# Patient Record
Sex: FEMALE | Race: WHITE | Employment: OTHER | URBAN - METROPOLITAN AREA
[De-identification: names, ages, dates, MRNs, and addresses within clinical notes are randomized per-mention and may not be internally consistent; named-entity substitution may affect disease eponyms.]

---

## 2019-11-21 ENCOUNTER — HOSPITAL ENCOUNTER (EMERGENCY)
Age: 84
Discharge: HOME | End: 2019-11-21
Attending: FAMILY MEDICINE
Payer: MEDICARE

## 2019-11-21 VITALS
TEMPERATURE: 97.9 F | BODY MASS INDEX: 25.49 KG/M2 | OXYGEN SATURATION: 95 % | WEIGHT: 135 LBS | RESPIRATION RATE: 18 BRPM | HEART RATE: 79 BPM | HEIGHT: 61 IN | DIASTOLIC BLOOD PRESSURE: 86 MMHG | SYSTOLIC BLOOD PRESSURE: 142 MMHG

## 2019-11-21 DIAGNOSIS — R04.0 EPISTAXIS: Primary | ICD-10-CM

## 2019-11-21 PROCEDURE — 30901 CONTROL OF NOSEBLEED: CPT

## 2019-11-21 PROCEDURE — 6370000000 HC RX 637 (ALT 250 FOR IP): Performed by: FAMILY MEDICINE

## 2019-11-21 PROCEDURE — 99282 EMERGENCY DEPT VISIT SF MDM: CPT

## 2019-11-21 RX ORDER — CEPHALEXIN 500 MG/1
500 CAPSULE ORAL ONCE
Status: COMPLETED | OUTPATIENT
Start: 2019-11-21 | End: 2019-11-21

## 2019-11-21 RX ORDER — CEPHALEXIN 500 MG/1
500 CAPSULE ORAL 4 TIMES DAILY
Qty: 40 CAPSULE | Refills: 0 | Status: SHIPPED | OUTPATIENT
Start: 2019-11-21 | End: 2019-12-01

## 2019-11-21 RX ADMIN — CEPHALEXIN 500 MG: 500 CAPSULE ORAL at 18:07

## 2021-04-11 ENCOUNTER — HOSPITAL ENCOUNTER (EMERGENCY)
Age: 86
Discharge: HOME | End: 2021-04-11
Payer: MEDICARE

## 2021-04-11 VITALS
DIASTOLIC BLOOD PRESSURE: 88 MMHG | TEMPERATURE: 100.7 F | SYSTOLIC BLOOD PRESSURE: 156 MMHG | BODY MASS INDEX: 25.51 KG/M2 | HEART RATE: 73 BPM | WEIGHT: 135 LBS | OXYGEN SATURATION: 94 % | RESPIRATION RATE: 20 BRPM

## 2021-04-11 DIAGNOSIS — N30.01 ACUTE CYSTITIS WITH HEMATURIA: Primary | ICD-10-CM

## 2021-04-11 LAB
BACTERIA: ABNORMAL /HPF
BILIRUBIN URINE: NEGATIVE
BLOOD, URINE: ABNORMAL
CLARITY: ABNORMAL
COLOR: YELLOW
EPITHELIAL CELLS, UA: ABNORMAL /HPF
GLUCOSE URINE: NEGATIVE MG/DL
HYALINE CASTS: ABNORMAL /LPF (ref 0–2)
KETONES, URINE: NEGATIVE MG/DL
LEUKOCYTE ESTERASE, URINE: ABNORMAL
MUCUS: PRESENT /LPF
NITRITE, URINE: NEGATIVE
PH UA: 5.5 (ref 5–9)
PROTEIN UA: NEGATIVE MG/DL
RBC UA: ABNORMAL /HPF (ref 0–2)
SPECIFIC GRAVITY UA: 1.02 (ref 1–1.03)
UROBILINOGEN, URINE: 0.2 E.U./DL
WBC UA: ABNORMAL /HPF (ref 0–5)

## 2021-04-11 PROCEDURE — 99211 OFF/OP EST MAY X REQ PHY/QHP: CPT

## 2021-04-11 PROCEDURE — 81001 URINALYSIS AUTO W/SCOPE: CPT

## 2021-04-11 PROCEDURE — 87088 URINE BACTERIA CULTURE: CPT

## 2021-04-11 RX ORDER — CEFDINIR 300 MG/1
300 CAPSULE ORAL 2 TIMES DAILY
Qty: 20 CAPSULE | Refills: 0 | Status: SHIPPED | OUTPATIENT
Start: 2021-04-11 | End: 2021-04-21

## 2021-04-11 RX ORDER — IBUPROFEN 200 MG
400 TABLET ORAL EVERY 6 HOURS PRN
COMMUNITY

## 2021-04-11 NOTE — ED PROVIDER NOTES
[facility]  Department of Emergency Medicine   ED  Encounter Note  Admit Date/RoomTime: 2021  6:13 PM  ED Room:     NAME: Akilah Cartwright  : 1933  MRN: [id number]     Chief Complaint:  Dysuria (Was treated for UTI  2 weeks ago. Was feeling better. Then last night started not feeling well and also having pain after urination.) and Chills (Started having chills today.)    History of Present Illness       Akilah Cartwright is a 80 y.o. old female who presents to the emergency department with a complaint of urinary tract infection. Patient states she gets recurrent urinary tract infections. She did go to [place] urgent care a few weeks ago and was diagnosed with a UTI. Put on Macrobid for 7 days. She states she did feel fine for about a week. Then last night she started with the issue again. It burns when she urinates. She says at the end of urination she gets a pain in her bladder and actually a weird tingling sensation that goes down both her arms. She states this is a normal presentation for her, even though it is odd yesterday she was also achy. Chills. Tired. Today the dysuria continued so family brought her here to urgent care. Family states in the past she has never been on Macrobid for her UTIs. Normally she is on Cipro. Regarding the weird tingling sensation down her arms. She does not get any pain in the chest.  No palpitations. No shortness of breath. She does not feel like she is going to pass out. ROS   Pertinent positives and negatives are stated within HPI, all other systems reviewed and are negative. Past Medical History:  has a past medical history of Arthritis, Atrial fibrillation ([address] Cancer ([address] Hx of blood clots, Hypertension, and Primary biliary cirrhosis ([address] Surgical History:  has a past surgical history that includes Hysterectomy; Breast surgery; Varicose vein surgery; skin biopsy; Colonoscopy;  Cataract removal with implant (10 08 2012); Cataract removal with implant (01-07-13); back surgery; eye surgery; Cardiac surgery; other surgical history (3/19/15); and other surgical history (3/19/15 ). Social History:  reports that she has never smoked. She has never used smokeless tobacco. She reports that she does not drink alcohol or use drugs. Family History: family history is not on file. Allergies: Quinine derivatives, Hydrochlorothiazide, and Penicillins    Physical Exam   Oxygen Saturation Interpretation: Normal.        ED Triage Vitals [04/11/21 1820]   BP Temp Temp Source Pulse Resp SpO2 Height Weight   (!) 156/88 100.7 °F (38.2 °C) Infrared 73 20 94 % -- 135 lb (61.2 kg)         General:  NAD. Alert and Oriented. Well-appearing. Low-grade fever of 100.7. Skin:  Warm, dry. No rashes. Head:  Normocephalic. Atraumatic. Eyes:  EOMI. Conjunctiva normal.  ENT:  Oral mucosa moist.  Airway patent. Neck:  Supple. Normal ROM. Respiratory:  No respiratory distress. No labored breathing. Lungs clear without rales, rhonchi or wheezing. Cardiovascular:  Regular rate. No Murmur. No peripheral edema. Extremities warm and good color. Chest:  Abdomen:  Soft, nondistended. Normal bowel sounds. Nontender to palpation all 4 quadrants. Negative rebound, negative guarding. Rectal:  Gu: Bladder nontender and non distended. No CVA tenderness. Pelvic:  Extremities:  Normal ROM. Nontender to palpation. Atraumatic. Arthritic changes to both hands with digit deformity. Back:  Normal ROM. Nontender to palpation. Neuro:  Alert and Oriented to person, place, time and situation. Normal LOC. Moves all extremities. Speech fluent. Psych:  Calm and Cooperative. Normal thought process. Normal judgement.         Lab / Imaging Results   (All laboratory and radiology results have been personally reviewed by myself)  Labs:  Results for orders placed or performed during the hospital encounter of 04/11/21   Urinalysis, reflex to microscopic   Result Value Ref Range    Color, UA Yellow Straw/Yellow    Clarity, UA SL CLOUDY Clear    Glucose, Ur Negative Negative mg/dL    Bilirubin Urine Negative Negative    Ketones, Urine Negative Negative mg/dL    Specific Gravity, UA 1.025 1.005 - 1.030    Blood, Urine TRACE (A) Negative    pH, UA 5.5 5.0 - 9.0    Protein, UA Negative Negative mg/dL    Urobilinogen, Urine 0.2 <2.0 E.U./dL    Nitrite, Urine Negative Negative    Leukocyte Esterase, Urine TRACE (A) Negative   Microscopic Urinalysis   Result Value Ref Range    Hyaline Casts, UA 0-2 0 - 2 /LPF    Mucus, UA Present (A) None Seen /LPF    WBC, UA 10-20 (A) 0 - 5 /HPF    RBC, UA 0-1 0 - 2 /HPF    Epithelial Cells, UA RARE /HPF    Bacteria, UA NONE SEEN None Seen /HPF       Imaging: All Radiology results interpreted by Radiologist unless otherwise noted. No orders to display     ED Course / Medical Decision Making   Medications - No data to display     Re-examination:  4/11/21       Time:     Patients symptoms . Consults:   None    Procedures:   none    Medical Decision Making:    Patient is well appearing, non toxic and appropriate for outpatient management. Plan is for symptom management and PCP follow up. As discussed with patient and daughter at bedside. I will do a urine culture. At this time there is only a trace amount of infection. Patient however is very symptomatic with burning, bladder pressure, fevers and chills. They will follow up in 3 days for urine culture results and to make sure she is on the best antibiotic. Counseling:  I reviewed today's visit with the patient in addition to providing specific details for the plan of care and counseling regarding the diagnosis and prognosis. Questions are answered at this time and are agreeable with the plan. Assessment      1. Acute cystitis with hematuria      Plan   Disposition:   Discharge home.   Patient condition is good    New Medications     New Prescriptions    CEFDINIR (OMNICEF) 300 MG CAPSULE    Take 1 capsule by mouth 2 times daily for 10 days     Electronically signed by MARIA INES Noel   DD: 4/11/21  **This report was transcribed using voice recognition software. Every effort was made to ensure accuracy; however, inadvertent computerized transcription errors may be present.   END OF ED PROVIDER NOTE         Giacomo Lopez [place]  04/11/21 0843

## 2021-04-13 LAB — URINE CULTURE, ROUTINE: NORMAL

## 2021-08-23 ENCOUNTER — HOSPITAL ENCOUNTER (EMERGENCY)
Age: 86
Discharge: HOME | End: 2021-08-23
Payer: MEDICARE

## 2021-08-23 VITALS
BODY MASS INDEX: 24.94 KG/M2 | SYSTOLIC BLOOD PRESSURE: 160 MMHG | TEMPERATURE: 98 F | WEIGHT: 132 LBS | HEART RATE: 68 BPM | RESPIRATION RATE: 16 BRPM | DIASTOLIC BLOOD PRESSURE: 78 MMHG | OXYGEN SATURATION: 97 %

## 2021-08-23 DIAGNOSIS — Z51.89 VISIT FOR WOUND CHECK: ICD-10-CM

## 2021-08-23 DIAGNOSIS — R58 BLEEDING: Primary | ICD-10-CM

## 2021-08-23 PROCEDURE — 6370000000 HC RX 637 (ALT 250 FOR IP): Performed by: NURSE PRACTITIONER

## 2021-08-23 PROCEDURE — 99211 OFF/OP EST MAY X REQ PHY/QHP: CPT

## 2021-08-23 RX ADMIN — Medication 1 EACH: at 18:40

## 2021-08-23 ASSESSMENT — PAIN DESCRIPTION - ORIENTATION: ORIENTATION: LEFT

## 2021-08-23 ASSESSMENT — PAIN SCALES - GENERAL: PAINLEVEL_OUTOF10: 3

## 2021-08-23 ASSESSMENT — PAIN DESCRIPTION - LOCATION: LOCATION: ARM

## 2021-08-23 NOTE — ED PROVIDER NOTES
derivatives, Hydrochlorothiazide, and Penicillins    Physical Exam   Oxygen Saturation Interpretation: Normal.   ED Triage Vitals [08/23/21 1823]   BP Temp Temp src Pulse Resp SpO2 Height Weight   (!) 160/78 98 °F (36.7 °C) -- 68 16 97 % -- 132 lb (59.9 kg)       Physical Exam  · Constitutional/General: Alert and oriented x3, well appearing, non toxic in NAD  · HEENT:  NC/NT. clear  Airway patent. · Neck: Supple, full ROM  · Respiratory:  Not in respiratory distress  · CV:  Regular rate. · Musculoskeletal: Moves all extremities x 4.   · Integument: skin warm and dry. No rashes.,  On the left forearm she has a Coban wrap along with the some gauze and it is bleeding through the dressing that she has on it. · Lymphatic: no lymphadenopathy noted  · Neurologic: GCS 15, no focal deficits, symmetric strength 5/5 in the upper and lower extremities bilaterally  · Psychiatric: Normal Affect    Lab / Imaging Results   (All laboratory and radiology results have been personally reviewed by myself)  Labs:  No results found for this visit on 08/23/21. Imaging: All Radiology results interpreted by Radiologist unless otherwise noted. No orders to display       ED Course / Medical Decision Making     Medications   gelatin adsorbable (GELFOAM) sponge 1 each (has no administration in time range)          Consult(s):   None    MDM:   I did remove the dressing that was on there and she has sutures in place and she has a small stream of blood oozing through the wound. It stops with pressure but as soon as pressure is let up it starts to bleed again. Wound was redressed I did apply surgifoam,Telfa ABD pads and an Ace wrap over this wound. I told her to go to the emergency department for further evaluation if there is any further bleeding. The dressing is remaining dry at this point , it is not bleeding through. Assessment      1. Bleeding    2.  Visit for wound check      Plan     Go to the  emergency department if there is any further bleeding, call Dr. Carlota Sen for further instructions if the bleeding stops    Discharge to home and advised to contact Georgia Hidalgo MD  [address] [place] [id number] [phone]    Schedule an appointment as soon as possible for a visit      Patient condition is good    New Medications     New Prescriptions    No medications on file     Electronically signed by GILBERT Loo CNP   DD: 8/23/21  **This report was transcribed using voice recognition software. Every effort was made to ensure accuracy; however, inadvertent computerized transcription errors may be present.   END OF ED PROVIDER NOTE     GILBERT Loo CNP  08/23/21 [id number]

## 2021-08-23 NOTE — ED NOTES
Gel foam applied with telfa dressing, gauze and ace wrap per order. Pt daughter instructed to monitor pt bleeding and circulation and adjust ace wrap as needed. Take pt to the ED if bleeding continues. Verbalized understanding.      Radha Crawford LPN  82/60/51 [phone]

## 2021-11-24 ENCOUNTER — OFFICE VISIT (OUTPATIENT)
Dept: SURGERY | Age: 86
End: 2021-11-24
Payer: MEDICARE

## 2021-11-24 VITALS
DIASTOLIC BLOOD PRESSURE: 82 MMHG | SYSTOLIC BLOOD PRESSURE: 132 MMHG | OXYGEN SATURATION: 97 % | WEIGHT: 130 LBS | TEMPERATURE: 97.3 F | HEIGHT: 61 IN | RESPIRATION RATE: 12 BRPM | BODY MASS INDEX: 24.55 KG/M2 | HEART RATE: 73 BPM

## 2021-11-24 DIAGNOSIS — C44.92 SCC (SQUAMOUS CELL CARCINOMA): Primary | ICD-10-CM

## 2021-11-24 PROCEDURE — G8427 DOCREV CUR MEDS BY ELIG CLIN: HCPCS | Performed by: PLASTIC SURGERY

## 2021-11-24 PROCEDURE — 99203 OFFICE O/P NEW LOW 30 MIN: CPT | Performed by: PLASTIC SURGERY

## 2021-11-24 PROCEDURE — 1123F ACP DISCUSS/DSCN MKR DOCD: CPT | Performed by: PLASTIC SURGERY

## 2021-11-24 PROCEDURE — 4040F PNEUMOC VAC/ADMIN/RCVD: CPT | Performed by: PLASTIC SURGERY

## 2021-11-24 PROCEDURE — 1036F TOBACCO NON-USER: CPT | Performed by: PLASTIC SURGERY

## 2021-11-24 PROCEDURE — G8420 CALC BMI NORM PARAMETERS: HCPCS | Performed by: PLASTIC SURGERY

## 2021-11-24 PROCEDURE — G8484 FLU IMMUNIZE NO ADMIN: HCPCS | Performed by: PLASTIC SURGERY

## 2021-11-24 PROCEDURE — 1090F PRES/ABSN URINE INCON ASSESS: CPT | Performed by: PLASTIC SURGERY

## 2021-11-24 NOTE — PROGRESS NOTES
Department of Plastic Surgery - Adult  Attending Consult Note      CHIEF COMPLAINT:   Squamous Cell Cancer of right leg    History Obtained From:  patient, daughter    HISTORY OF PRESENT ILLNESS:                The patient is a 80 y.o. 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 The patient states that she has had this lesion for several weeks now which is rapidly grown. She presents today with her daughter. This has recently been biopsied by her dermatologist who has referred her to our office for complete excision. Her daughter brings to my attention today that she is on Eliquis and has had issues with bleeding while on Eliquis in the past with prior dermatologic treatments. She states that the last time she had a biopsy done with her dermatologist she was off her Eliquis for 4 days as recommended by her primary care physician and had no issues with postprocedural bleeding. She has been keeping this lesion covered with bacitracin and a bandage.       Past Medical History:    Past Medical History:   Diagnosis Date    Arthritis     Atrial fibrillation ([address]     Cancer ([address]     skin    Hx of blood clots     thromophlebitis yrs ago    Hypertension     Primary biliary cirrhosis ([address]      Past Surgical History:    Past Surgical History:   Procedure Laterality Date    BACK SURGERY      compression fx    BREAST SURGERY      lumpectomy right benign    CARDIAC SURGERY      heart catheterization    CATARACT REMOVAL WITH IMPLANT  10 08 2012    left    CATARACT REMOVAL WITH IMPLANT  01-07-13    right     COLONOSCOPY      EYE SURGERY      HYSTERECTOMY      OTHER SURGICAL HISTORY  3/19/15    I and D of right vulva abcess    OTHER SURGICAL HISTORY  3/19/15     i&D of vulva    SKIN BIOPSY      VARICOSE VEIN SURGERY      bilateral      Current Medications:       Current Outpatient Medications   Medication Sig Dispense Refill    ibuprofen (ADVIL;MOTRIN) 200 MG tablet Take 400 mg by mouth every 6 hours as needed for Pain      sertraline (ZOLOFT) 50 MG tablet Take 50 mg by mouth daily      Cyanocobalamin (VITAMIN B 12 PO) Take by mouth      apixaban (ELIQUIS) 5 MG TABS tablet Take 2.5 mg by mouth 2 times daily       Calcium-Vitamin D (CALTRATE 600 PLUS-VIT D PO) Take 1 tablet by mouth 2 times daily.  metoprolol (TOPROL-XL) 25 MG XL tablet Take 1 tablet by mouth daily. 30 tablet 3    amLODIPine (NORVASC) 5 MG tablet Take 5 mg by mouth 2 times daily.  losartan (COZAAR) 100 MG tablet Take 100 mg by mouth daily.  potassium chloride (KLOR-CON) 10 MEQ CR tablet Take 10 mEq by mouth daily.  PREDNISONE Take 7.5 mg by mouth daily.  traMADol (ULTRAM) 50 MG tablet Take 50 mg by mouth every 8 hours as needed.  therapeutic multivitamin-minerals (THERAGRAN-M) tablet Take 1 tablet by mouth daily. No current facility-administered medications for this visit. Allergies:  Quinine derivatives, Hydrochlorothiazide, and Penicillins    Social History:   Social History     Socioeconomic History    Marital status:      Spouse name: Not on file    Number of children: Not on file    Years of education: Not on file    Highest education level: Not on file   Occupational History    Not on file   Tobacco Use    Smoking status: Never Smoker    Smokeless tobacco: Never Used   Vaping Use    Vaping Use: Never used   Substance and Sexual Activity    Alcohol use: No    Drug use: No    Sexual activity: Not on file   Other Topics Concern    Not on file   Social History Narrative    Not on file     Social Determinants of Health     Financial Resource Strain:     Difficulty of Paying Living Expenses: Not on file   Food Insecurity:     Worried About Running Out of Food in the Last Year: Not on file    [place] of Food in the Last Year: Not on file   Transportation Needs:     Lack of Transportation (Medical):  Not on file    Lack of Transportation (Non-Medical): Not on file   Physical Activity:     Days of Exercise per Week: Not on file    Minutes of Exercise per Session: Not on file   Stress:     Feeling of Stress : Not on file   Social Connections:     Frequency of Communication with Friends and Family: Not on file    Frequency of Social Gatherings with Friends and Family: Not on file    Attends Baptism Services: Not on file    Active Member of [address] [place] [address] or Organizations: Not on file    Attends Club or Organization Meetings: Not on file    Marital Status: Not on file   Intimate Partner Violence:     Fear of Current or Ex-Partner: Not on file    Emotionally Abused: Not on file    Physically Abused: Not on file    Sexually Abused: Not on file   Housing Stability:     Unable to Pay for Housing in the Last Year: Not on file    Number of [place] in the Last Year: Not on file    Unstable Housing in the Last Year: Not on file     Family History:   History reviewed. No pertinent family history. REVIEW OF SYSTEMS:    CONSTITUTIONAL:  negative for  fevers, chills, sweats and fatigue  EYES: negative for dipolpia or acute vision loss. RESPIRATORY:  negative for  dry cough, cough with sputum, dyspnea, wheezing and chest pain  HENT:negative for pain, headache, difficulty swallowing or nose bleeds. CARDIOVASCULAR:  negative for  chest pain, dyspnea, palpitations, syncope  GASTROINTESTINAL:  negative for nausea, vomiting, change in bowel habits, diarrhea, constipation and abdominal pain  EXTREMITIES: negative for edema  MUSCULOSKELETAL: negative for muscle weakness  SKIN: positive for lesion, negative for itching or rashes. HEME: negative for easy brusing or bleeding  PSYCH: Alert and oriented to person place and time    I have reviewed the chief complaint and history of present illness including (ROS and PFSH) and vital documentation by my staff and I agree with their documentation and have added when applicable.    CASH ROS    PHYSICAL EXAM:    VITALS:  /82 (Site: Left Upper Arm, Position: Sitting, Cuff Size: Medium Adult)   Pulse 73   Temp 97.3 °F (36.3 °C) (Infrared)   Resp 12   Ht 5' 1\" (1.549 m)   Wt 130 lb (59 kg)   SpO2 97%   BMI 24.56 kg/m²   CONSTITUTIONAL:  awake, alert, cooperative, no apparent distress, and appears stated age  EYES: PERRLA, EOMI, no signs of occular infection  LUNGS:  No increased work of breathing, good air exchange, clear to auscultation bilaterally, no crackles or wheezing  CARDIOVASCULAR:  Normal apical impulse, regular rate and rhythm  EXTREMITIES: no signs of clubbing or cyanosis. MUSCULOSKELETAL: negative for flaccid muscle tone or spastic movements. NEURO: Cranial nerves II-XII grossly intact. No signs of agitated mood. SKIN: Biopsy proven right lower leg squamous cell carcinoma-  12mm x 12mm,   in color, irregular border, raised, no signs of bleeding,drainage or infection. Non tender to palpation.        DATA:    Labs: CBC:   Lab Results   Component Value Date    WBC 9.7 03/13/2015    RBC 4.66 03/13/2015    HGB 14.1 03/13/2015    HCT 41.4 03/13/2015    MCV 88.8 03/13/2015    MCH 30.3 03/13/2015    MCHC 34.1 03/13/2015    RDW 15.2 03/13/2015     03/13/2015    MPV 7.7 03/13/2015     BMP:    Lab Results   Component Value Date     03/13/2015    K 4.2 03/13/2015    CL 97 03/13/2015    CO2 26 03/13/2015    BUN 18 03/13/2015    LABALBU 4.1 03/13/2015    CREATININE 1.0 03/13/2015    CALCIUM 9.9 03/13/2015    GFRAA >60 03/13/2015    LABGLOM 53 03/13/2015    GLUCOSE 76 03/13/2015       Radiology Review:  No radiology needed at this time  Pathology Review:  Pathology reviewed           IMPRESSION/RECOMMENDATIONS:        Diagnosis  -) Squamous cell carcinoma right lower leg      -The patient was counseled on their pathology results and the need for surgical   intervention.   -We will plan to proceed and have the lesion formely excised with margins in the office.  -The patient will not require frozen sections in the operating room  -The patient will not require pre-op clearance from their PCP. -The risks, benefits and options were discussed with the pt. The risks included but not limited to pain, bleeding, infection, heavy scarring, damage to surrounding structures, fluid collections, asymmetry, and need for further procedures. All of Her questions were answered to their satisfaction and She agrees to proceed with the procedure. Photos obtained    Hold Eliquis for 4 days prior to procedure discussed with PCP will need clearance  Could discontinue bacitracin gauze pad leave open to air    Surgical plan excision of right lower leg squamous cell carcinoma in office under local anesthesia    This document is generated, in part, by voice recognition software and thus  syntax and grammatical errors are possible.     Jesse Hassan MD  1:22 PM  12/1/2021

## 2021-11-29 ENCOUNTER — TELEPHONE (OUTPATIENT)
Dept: SURGERY | Age: 86
End: 2021-11-29

## 2021-11-29 NOTE — TELEPHONE ENCOUNTER
Need medical clearance re: eliquis to stop 4 days prior to office procedure  Office Procedure: Excision squamous cell carcinoma right leg lateral

## 2021-11-29 NOTE — TELEPHONE ENCOUNTER
John Najera, daughter of patient called office asked to speak with Megan Villanueva again.  Please call

## 2021-12-07 NOTE — TELEPHONE ENCOUNTER
Left message to call the office returning her phone call we need the okay to stop the anticoagulant medication and we need to schedule procedure in office.

## 2021-12-08 NOTE — TELEPHONE ENCOUNTER
Office procedure scheduled   Patient has instructions to stop the Eliquis 4 days prior office procedure from their prescribing physician. If taking Fish Oil, Vitamins, two weeks prior to surgery stop taking. If taking NSAIDS (such as Aspirin, Ibuprofen)  anticoagulants please consult with your prescribing physician to get further instructions on when to stop medication prior to surgery that is scheduled, patient understood.

## 2021-12-08 NOTE — TELEPHONE ENCOUNTER
Aaliyah Courts called left  requesting phone call from [facility]. Med clear fax in box.

## 2021-12-22 ENCOUNTER — PROCEDURE VISIT (OUTPATIENT)
Dept: SURGERY | Age: 86
End: 2021-12-22
Payer: MEDICARE

## 2021-12-22 VITALS — TEMPERATURE: 97.3 F

## 2021-12-22 DIAGNOSIS — C44.92 SCC (SQUAMOUS CELL CARCINOMA): Primary | ICD-10-CM

## 2021-12-22 PROCEDURE — 12032 INTMD RPR S/A/T/EXT 2.6-7.5: CPT | Performed by: PLASTIC SURGERY

## 2021-12-22 PROCEDURE — 11602 EXC TR-EXT MAL+MARG 1.1-2 CM: CPT | Performed by: PLASTIC SURGERY

## 2021-12-22 RX ORDER — LIDOCAINE HYDROCHLORIDE AND EPINEPHRINE 10; 10 MG/ML; UG/ML
3 INJECTION, SOLUTION INFILTRATION; PERINEURAL ONCE
Status: COMPLETED | OUTPATIENT
Start: 2021-12-22 | End: 2021-12-22

## 2021-12-22 RX ORDER — CLINDAMYCIN HYDROCHLORIDE 300 MG/1
300 CAPSULE ORAL 3 TIMES DAILY
Qty: 15 CAPSULE | Refills: 0 | Status: SHIPPED | OUTPATIENT
Start: 2021-12-22 | End: 2021-12-27

## 2021-12-22 RX ORDER — GINSENG 100 MG
CAPSULE ORAL
Qty: 1 EACH | Refills: 1 | Status: SHIPPED
Start: 2021-12-22 | End: 2022-08-02

## 2021-12-22 RX ORDER — ACETAMINOPHEN AND CODEINE PHOSPHATE 300; 30 MG/1; MG/1
1 TABLET ORAL EVERY 4 HOURS PRN
Qty: 10 TABLET | Refills: 0 | Status: SHIPPED | OUTPATIENT
Start: 2021-12-22 | End: 2021-12-27

## 2021-12-22 RX ADMIN — LIDOCAINE HYDROCHLORIDE AND EPINEPHRINE 3 ML: 10; 10 INJECTION, SOLUTION INFILTRATION; PERINEURAL at 14:42

## 2021-12-22 NOTE — PROGRESS NOTES
Subjective: Follow up today for excision of right lateral leg squamous cell carcinoma. Denies fever, nausea, vomiting, leg pain or swelling. The patient voices understanding of the procedure they are having today and would like to proceed. Patient states that the mass has been painful and growing. Objective: There were no vitals taken for this visit. Right  Lateral leg basal cell carcinoma  Lesion- 10mm x 10mm  Margin- 5mm  Defect- 20mm x 20mm  Scar-80mm         Plan:       Diagnosis  -) Excision of Right latearl leg squamous cell carcinoma  -) Pain    -The risks, benefits and options were discussed with the pt. The risks included but not limited to pain, bleeding, infection, heavy scarring, damage to surrounding structures, fluid collections, asymmetry, and need for further procedures. All of Her questions were answered to their satisfaction and She agrees to proceed with the procedure.        -After consent was obtained, the area was cleansed with an alcohol swab. Local anesthesia consisting of 1% lidocaine with 1:100,000 epinepherine was injected  surrounding the area. The local was allowed to work. Using a 10-blade the Right lateral leg squamous cell carcinoma was excised,  intermediate repair was performed. The wound(s) were closed partially with 3-0 vicryl and a running locking 3-0 prolene hemostasis was obtained and a bacitracin and gauze dressing was placed over the wound. The procedure was performed by Dr Brian Mcdermott     Please schedule an appointment to be seen on Follow-up with our office in 7-14 days  Diet: regular diet  Activity: no heavy lifting for 7 days. Shower/Bathing: OK to shower over the wound site in 48 hours. No baths, hot tubs or soaking of the wound site at this time. Pt voices understanding. Wound care:   Bacitracin will need to be placed over the wound site twice daily and covered with a gauze pad.   The gauze pad can be changed as needed for saturation    Medications: As prescribed  Educated to contact physician with concerns or signs of infection (redness, increasing pain, discharge, odor, fever). The entirety of the procedure was performed by Dr. Faby Lopez with first assistance by MARIA INES Palacios      Please note that the medical decision making for the patient as well as the subjective, objective, assessment and plan were reviewed and performed by Dr Faby Lopez    I explained to the patient's daughter the dressing instructions which would be once daily bacitracin over the wound site cover with gauze pad change as needed saturation I explained to her that second to the tissue quality of her mother's leg it was unable to close centrally which we will allow to heal by secondary intention. They voiced understanding and appreciation. This document is generated, in part, by voice recognition software and thus  syntax and grammatical errors are possible.     Luiza Georges MD  12:46 PM  1/6/2022

## 2022-01-03 ENCOUNTER — OFFICE VISIT (OUTPATIENT)
Dept: SURGERY | Age: 87
End: 2022-01-03

## 2022-01-03 VITALS — TEMPERATURE: 97 F

## 2022-01-03 DIAGNOSIS — C44.92 SCC (SQUAMOUS CELL CARCINOMA): Primary | ICD-10-CM

## 2022-01-03 PROCEDURE — 99024 POSTOP FOLLOW-UP VISIT: CPT | Performed by: PHYSICIAN ASSISTANT

## 2022-01-03 NOTE — PROGRESS NOTES
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 Denies fever, nausea, vomiting, leg pain or swelling, pain is absent. The pt states that they have  kept the wound site(s) covered with bacitracin. The patient states that they have  finished their antibiotic. They state that their pain is absent. Objective:    Temp 97 °F (36.1 °C) (Temporal)   Breastfeeding No       Wound: Clean, dry, and intact, no drainage. No signs of infection, wound healing well  Is intact to superior and inferior aspect of wound centralized granulation tissue healing with no signs of infection 20 mm x 10 mm x 2 mm in depth. Neuro- Sensation  intact to charli incisional site with light touch . Cranial nerves II-XII grossly intact. Assessment:    Patient Active Problem List   Diagnosis    [facility]    Hypertension    Primary biliary cholangitis ([facility]    Syncope due to orthostatic hypotension       Labs: CBC:   Lab Results   Component Value Date    WBC 9.7 03/13/2015    RBC 4.66 03/13/2015    HGB 14.1 03/13/2015    HCT 41.4 03/13/2015    MCV 88.8 03/13/2015    MCH 30.3 03/13/2015    MCHC 34.1 03/13/2015    RDW 15.2 03/13/2015     03/13/2015    MPV 7.7 03/13/2015     BMP:    Lab Results   Component Value Date     03/13/2015    K 4.2 03/13/2015    CL 97 03/13/2015    CO2 26 03/13/2015    BUN 18 03/13/2015    LABALBU 4.1 03/13/2015    CREATININE 1.0 03/13/2015    CALCIUM 9.9 03/13/2015    GFRAA >60 03/13/2015    LABGLOM 53 03/13/2015    GLUCOSE 76 03/13/2015         Pathology Report- Pending. Plan:     Educated the pt on their pathology report. Pt voices understanding      Dressing -continue with once daily dressing changes as previously directed with bacitracin and gauze pad. Showering at this time -[id number] removed today. F/U 4  weeks. Call office with concerns or signs of infection.     Sudha Smoker, PA 10:23 AM  1/3/2022

## 2022-02-03 NOTE — PROGRESS NOTES
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 Denies fever, nausea, vomiting, leg pain or swelling, pain is absent. The pt states that they have  kept the wound site(s) covered with bacitracin. The patient daughter voiced no complaints at this time and presented for continued wound examination. Objective: There were no vitals taken for this visit. Wound: Clean, dry, and intact, no drainage. No signs of infection, wound remained stagnant and approximately 3 cm x 1.5 cm, no tracking    Neuro- Sensation  intact to charli incisional site with light touch . Cranial nerves II-XII grossly intact.      Assessment:    Patient Active Problem List   Diagnosis    Atrial fibrillation ([facility]    Hypertension    Primary biliary cholangitis ([facility]    Syncope due to orthostatic hypotension       Labs: CBC:   Lab Results   Component Value Date    WBC 9.7 03/13/2015    RBC 4.66 03/13/2015    HGB 14.1 03/13/2015    HCT 41.4 03/13/2015    MCV 88.8 03/13/2015    MCH 30.3 03/13/2015    MCHC 34.1 03/13/2015    RDW 15.2 03/13/2015     03/13/2015    MPV 7.7 03/13/2015     BMP:    Lab Results   Component Value Date     03/13/2015    K 4.2 03/13/2015    CL 97 03/13/2015    CO2 26 03/13/2015    BUN 18 03/13/2015    LABALBU 4.1 03/13/2015    CREATININE 1.0 03/13/2015    CALCIUM 9.9 03/13/2015    GFRAA >60 03/13/2015    LABGLOM 53 03/13/2015    GLUCOSE 76 03/13/2015         Pathology Report- [address]           [address]     [address]            [address]                                                   [address] [facility]   [place], [address]          [place] [id number]               FINAL

## 2022-02-07 ENCOUNTER — OFFICE VISIT (OUTPATIENT)
Dept: SURGERY | Age: 87
End: 2022-02-07

## 2022-02-07 VITALS — HEART RATE: 76 BPM | TEMPERATURE: 97.1 F | OXYGEN SATURATION: 93 %

## 2022-02-07 DIAGNOSIS — C44.92 SCC (SQUAMOUS CELL CARCINOMA): Primary | ICD-10-CM

## 2022-02-07 PROCEDURE — 99024 POSTOP FOLLOW-UP VISIT: CPT | Performed by: PHYSICIAN ASSISTANT

## 2022-03-16 NOTE — PROGRESS NOTES
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 Denies fever, nausea, vomiting, leg pain or swelling, pain is absent. The pt states that they have  kept the wound site(s) covered with vaseline. The patient daughter voiced no complaints at this time and presented for continued wound examination. Objective: There were no vitals taken for this visit. Wound: Clean, dry, and intact, no drainage.   No signs of infection, wound well-healed with superficial Crusting scab ( previously 3 cm x 1.5 cm ), no tracking      Assessment:    Patient Active Problem List   Diagnosis    Atrial fibrillation ([facility]    Hypertension    Primary biliary cholangitis ([facility]    Syncope due to orthostatic hypotension       Labs: CBC:   Lab Results   Component Value Date    WBC 9.7 03/13/2015    RBC 4.66 03/13/2015    HGB 14.1 03/13/2015    HCT 41.4 03/13/2015    MCV 88.8 03/13/2015    MCH 30.3 03/13/2015    MCHC 34.1 03/13/2015    RDW 15.2 03/13/2015     03/13/2015    MPV 7.7 03/13/2015     BMP:    Lab Results   Component Value Date     03/13/2015    K 4.2 03/13/2015    CL 97 03/13/2015    CO2 26 03/13/2015    BUN 18 03/13/2015    LABALBU 4.1 03/13/2015    CREATININE 1.0 03/13/2015    CALCIUM 9.9 03/13/2015    GFRAA >60 03/13/2015    LABGLOM 53 03/13/2015    GLUCOSE 76 03/13/2015         Pathology Report- [address]      [facility] [address]     [address] [facility]            [address]                                                   [address]   [place], [address], [address] [place] [address]                                                           [id number]               FINAL SURGICAL PATHOLOGY REPORT     NAME:            JENIFER CARBAJAL             Date of       2021                                            Collection:   Medical Record   [id number]              Date of       2021   Number:                                  Receipt:   Age:  80 Y        Sex:  F                Date          2022 13:51                                            Reported:   Date Of Birth:   1933   Financial        [id number]            Admitting     BANDAR WILLSON   Number:                                  Physician:   Patient          NORBERT                   Ordering      BANDAR WILLSON   Location:                                Physician:     Accession Number:  [facility]-             Diagnosis:   Skin lesion right lower leg, excision: Invasive squamous carcinoma,   margins of excision are negative for neoplasm.                                              JARRETT Geiger                                   (Electronic Signature)     Plan:     Educated the pt and daughter on their pathology report. Pt voices understanding      Dressing -continue with Vaseline only and gauze pad. Scar Care Instructions      Sunscreen Recommendations for Scars   We recommend that all patients use a sunscreen when outside but especially on new scars (that are exposed to sunlight) for a year after their procedure.  The SPF should be at least 28. Follow the application directions on the bottle. Why is it so Important to Use Sunscreen on Scars?  A scar is new and more fragile than the surrounding skin.  If you do not use sunscreen, the scar line will react differently to the sun than the surrounding skin.  If you don't use sunscreen, the scar tissue will become darker than surrounding skin. This is a hyper-pigmented scar and will remain darker than the other skin.  After one year, the scar and surrounding skin should react equally to sun. Superficial Scar Massage   Scar massage desensitizes and reduces scar adhesions so skin glides freely.     Rub in a circular motion on and around the scar with firm, even pressure for 5 minutes four times per day    You can start scar massage once incision is completely healed and strong enough to handle the motion (usually 10 - 14 days post operatively).  You use lotion to do the scar massage to allow ease with motion over the scar and prevent friction at the area. Patient had no further Questions. Paper instructions given to patient. F/U PRN  Call office with concerns or signs of infection.     MARIA INES Palacios

## 2022-03-21 ENCOUNTER — OFFICE VISIT (OUTPATIENT)
Dept: SURGERY | Age: 87
End: 2022-03-21

## 2022-03-21 VITALS — TEMPERATURE: 96.8 F

## 2022-03-21 DIAGNOSIS — C44.92 SCC (SQUAMOUS CELL CARCINOMA): Primary | ICD-10-CM

## 2022-03-21 PROCEDURE — 99024 POSTOP FOLLOW-UP VISIT: CPT | Performed by: PHYSICIAN ASSISTANT

## 2022-06-27 ENCOUNTER — TELEPHONE (OUTPATIENT)
Dept: NON INVASIVE DIAGNOSTICS | Age: 87
End: 2022-06-27

## 2022-08-02 ENCOUNTER — OFFICE VISIT (OUTPATIENT)
Dept: NON INVASIVE DIAGNOSTICS | Age: 87
End: 2022-08-02
Payer: MEDICARE

## 2022-08-02 VITALS
SYSTOLIC BLOOD PRESSURE: 114 MMHG | DIASTOLIC BLOOD PRESSURE: 70 MMHG | HEIGHT: 61 IN | RESPIRATION RATE: 16 BRPM | HEART RATE: 58 BPM | WEIGHT: 127 LBS | BODY MASS INDEX: 23.98 KG/M2

## 2022-08-02 DIAGNOSIS — I48.11 LONGSTANDING PERSISTENT ATRIAL FIBRILLATION (HCC): Primary | ICD-10-CM

## 2022-08-02 PROCEDURE — 99213 OFFICE O/P EST LOW 20 MIN: CPT | Performed by: INTERNAL MEDICINE

## 2022-08-02 PROCEDURE — 93000 ELECTROCARDIOGRAM COMPLETE: CPT | Performed by: INTERNAL MEDICINE

## 2022-08-02 PROCEDURE — 1123F ACP DISCUSS/DSCN MKR DOCD: CPT | Performed by: INTERNAL MEDICINE

## 2022-08-02 NOTE — PROGRESS NOTES
Cardiac Electrophysiology Outpatient Progress Note    Lory Alvarez  12/16/1933  Date of Service: 8/2/2022  Referring Provider/PCP: Tanna Benítez MD  Chief Complaint:   Chief Complaint   Patient presents with    Atrial Fibrillation     Follow up. Patient has no complaints          Patient Active Problem List    Diagnosis Date Noted    Atrial fibrillation ([facility] [address] 10/29/2014     Priority: High    Hypertension 10/29/2014     Priority: Low    Primary biliary cholangitis ([facility] [address] 10/30/2014     Overview Note:     Updating Deprecated Diagnoses      Syncope due to orthostatic hypotension 10/30/2014     PMH    1. Hypertension. 2.  Paroxysmal atrial fibrillation. 3.  Primary biliary cirrhosis, but the patient's bilirubin level in 2015 was 0.6 and she has no history of esophageal varices or GI bleeding. 4.  Sotalol for maintenance of sinus rhythm since 2011 which was switched to amiodarone in 2014. 5.  Persistent atrial fibrillation in 2016 for possibly more than 3 to 4 months. 6.  The patient was quite distressed during her last office visit with the loss of her  in July 2016.  7.   Asymptomatic persistent atrial fibrillation. Patient and her daughter opted for rate control rather than rhythm control and therefore amiodarone was discontinued in 2016      Current Outpatient Medications   Medication Sig Dispense Refill    AZO-CRANBERRY PO Take by mouth      ibuprofen (ADVIL;MOTRIN) 200 MG tablet Take 400 mg by mouth every 6 hours as needed for Pain      sertraline (ZOLOFT) 50 MG tablet Take 50 mg by mouth daily      apixaban (ELIQUIS) 5 MG TABS tablet Take 2.5 mg by mouth 2 times daily       metoprolol (TOPROL-XL) 25 MG XL tablet Take 1 tablet by mouth daily. 30 tablet 3    amLODIPine (NORVASC) 5 MG tablet Take 5 mg by mouth 2 times daily. losartan (COZAAR) 100 MG tablet Take 100 mg by mouth daily. PREDNISONE Take 7.5 mg by mouth daily.         traMADol (ULTRAM) 50 MG tablet Take 50 mg by mouth every 8 hours as needed. No current facility-administered medications for this visit. Allergies   Allergen Reactions    Hydrochlorothiazide Other (See Comments)     Electrolyte imbalance    Quinine Derivatives Anaphylaxis    Penicillins Rash       SUBJECTIVE: Shwetha Strong presents to the office today for follow-up of permanent atrial fibrillation. She denies any new cardiac symptoms today. She is able to ambulate without any difficulty. She even works for short periods in her garden without any shortness of breath or fatigue. No complaints of exertional chest pain or shortness of breath as mentioned. No syncope or near syncope. No symptoms of paroxysmal nocturnal dyspnea, orthopnea or leg edema    ROS:   Constitutional: Negative for fever, activity change and appetite change. HENT: Negative for epistaxis, difficulty swallowing. Eyes: Negative for blurred vision or double vision. Respiratory: Negative for cough, chest tightness, shortness of breath and wheezing. Cardiovascular: Negative for chest pain, palpitations and leg swelling. Gastrointestinal: Negative for abdominal pain, heartburn, or blood in stool. Genitourinary: Negative for hematuria, burning or frequency. Musculoskeletal: Negative for myalgias, stiffness, or swelling. Skin: Negative for rash, pain, or lumps. Neurological: Negative for syncope, seizures, or headaches. Psychiatric/Behavioral: Negative for confusion and agitation. The patient is not nervous/anxious. PHYSICAL EXAM:  Vitals:    08/02/22 1527   BP: 114/70   Pulse: 58   Resp: 16   Weight: 127 lb (57.6 kg)   Height: 5' 1\" (1.549 m)     Constitutional: Oriented to person, place, and time. Well-developed and cooperative. Head: Normocephalic and atraumatic. Eyes: Conjunctivae are normal. Pupils are equal, round, and reactive to light. Neck: No hepatojugular reflux and no JVD present. Carotid bruit is not present.    Cardiovascular: Laterally displaced PMI, normal S1 and S2 with left sternal border and the apex, grade 2/6 systolic ejection murmur at the aortic area and left sternal border  Pulmonary/Chest: Effort normal and breath sounds normal. No respiratory distress. Abdominal: Soft. Normal appearance and bowel sounds are normal.    Musculoskeletal: Normal range of motion of all extremities, no muscle weakness. Neurological: Alert and oriented to person, place, and time. Gait normal.   Skin: Skin is warm and dry. No bruising, no ecchymosis and no rash noted. Extremity: No clubbing or cyanosis. No edema. Psychiatric: Normal mood and affect.  Thought content normal.     Pertinent Labs:  CBC:   WBC   Date Value Ref Range Status   03/13/2015 9.7 4.5 - 11.5 E9/L Final   10/29/2014 8.7 4.5 - 11.5 E9/L Final     Hemoglobin   Date Value Ref Range Status   03/13/2015 14.1 11.5 - 15.5 g/dL Final   10/29/2014 13.8 11.5 - 15.5 g/dL Final     Hematocrit   Date Value Ref Range Status   03/13/2015 41.4 34.0 - 48.0 % Final   10/29/2014 42.2 34.0 - 48.0 % Final     Platelets   Date Value Ref Range Status   03/13/2015 266 130 - 450 E9/L Final   10/29/2014 293 130 - 450 E9/L Final     BMP:   Lab Results   Component Value Date/Time     03/13/2015 12:22 PM    K 4.2 03/13/2015 12:22 PM    CL 97 03/13/2015 12:22 PM    CO2 26 03/13/2015 12:22 PM    BUN 18 03/13/2015 12:22 PM    CREATININE 1.0 03/13/2015 12:22 PM    GLUCOSE 76 03/13/2015 12:22 PM    CALCIUM 9.9 03/13/2015 12:22 PM      ABGs: No results found for: PHART, PO2ART, WQT7LLI  INR:   Lab Results   Component Value Date    INR 1.0 03/19/2015    INR 3.2 03/13/2015    INR 2.9 10/30/2014     BNP: No results found for: BNP  TSH:   Lab Results   Component Value Date    TSH 1.160 10/28/2014      Cardiac Injury Profile: No results found for: CKTOTAL, CKMB, CKMBINDEX, TROPONINI  Lipid Profile: No results found for: TRIG, HDL, LDLCALC, CHOL   Hemoglobin A1C: No results found for: LABA1C      Pertinent Cardiac Testing:     ECG 8/2/2022: Atrial fibrillation with controlled v rate    ECHO :2014     Conclusions      Summary   Left ventricle size is normal.   Normal LV segmental wall motion. Borderline concentric left ventricular hypertrophy. Normal left ventricular ejection fraction. The left atrium is mild-moderately dilated. Structurally normal mitral valve. Physiologic and/or trace mitral regurgitation is present. Aortic valve opens well. The aortic valve is trileaflet. The aortic valve appears mildly sclerotic. Normal tricuspid valve structure and function. Physiologic and/or trace tricuspid regurgitation. No evidence of pericardial effusion. Signature      ----------------------------------------------------------------   Electronically signed by Brigida Bolden MD(Interpreting   physician) on 10/29/2014 01:37 PM   --------------------------------------------------------    I have independently reviewed all of the ECGs and rhythm strips per above    I have personally reviewed the laboratory, cardiac diagnostic and radiographic testing as outlined above:      Impression:     1. Permanent atrial fibrillation --patient on low-dose beta-blockade for rate control  Systemic anticoagulation with Eliquis 2.5 mg twice a day    2. 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 Continue metoprolol and Eliquis  2. Annual follow-up. All of the above was discussed with the patient and her daughter,>50% of the time involved face-to-face time providing counseling and or coordination of care with the other providers, preparation for the clinic visit, reviewing records/tests, counseling/education of the patient, ordering medications/tests/procedures, coordinating care, and documenting clinical information in the EHR. Thank you for allowing me to participate in your patient's care.     Brigida Bolden MD, [facility] [place]    Cardiac

## 2024-04-30 ENCOUNTER — APPOINTMENT (OUTPATIENT)
Dept: GENERAL RADIOLOGY | Age: 89
End: 2024-04-30
Payer: MEDICARE

## 2024-04-30 ENCOUNTER — HOSPITAL ENCOUNTER (EMERGENCY)
Age: 89
Discharge: HOME | End: 2024-04-30
Payer: MEDICARE

## 2024-04-30 VITALS
HEART RATE: 64 BPM | TEMPERATURE: 97.8 F | DIASTOLIC BLOOD PRESSURE: 82 MMHG | OXYGEN SATURATION: 95 % | SYSTOLIC BLOOD PRESSURE: 100 MMHG | RESPIRATION RATE: 18 BRPM

## 2024-04-30 DIAGNOSIS — R74.8 ELEVATED LIVER ENZYMES: ICD-10-CM

## 2024-04-30 DIAGNOSIS — M54.31 SCIATICA OF RIGHT SIDE: Primary | ICD-10-CM

## 2024-04-30 LAB
ALBUMIN SERPL-MCNC: 3.2 G/DL (ref 3.5–5.2)
ALP SERPL-CCNC: 217 U/L (ref 35–104)
ALT SERPL-CCNC: 36 U/L (ref 0–32)
ANION GAP SERPL CALCULATED.3IONS-SCNC: 10 MMOL/L (ref 7–16)
AST SERPL-CCNC: 46 U/L (ref 0–31)
BASOPHILS # BLD: 0.01 K/UL (ref 0–0.2)
BASOPHILS NFR BLD: 0 % (ref 0–2)
BILIRUB SERPL-MCNC: 0.7 MG/DL (ref 0–1.2)
BILIRUB UR QL STRIP: NEGATIVE
BUN SERPL-MCNC: 23 MG/DL (ref 6–23)
CALCIUM SERPL-MCNC: 8.7 MG/DL (ref 8.6–10.2)
CHLORIDE SERPL-SCNC: 102 MMOL/L (ref 98–107)
CLARITY UR: CLEAR
CO2 SERPL-SCNC: 22 MMOL/L (ref 22–29)
COLOR UR: YELLOW
CREAT SERPL-MCNC: 0.7 MG/DL (ref 0.5–1)
EOSINOPHIL # BLD: 0.03 K/UL (ref 0.05–0.5)
EOSINOPHILS RELATIVE PERCENT: 0 % (ref 0–6)
EPI CELLS #/AREA URNS HPF: NORMAL /HPF
ERYTHROCYTE [DISTWIDTH] IN BLOOD BY AUTOMATED COUNT: 15 % (ref 11.5–15)
GFR SERPL CREATININE-BSD FRML MDRD: 83 ML/MIN/1.73M2
GLUCOSE SERPL-MCNC: 87 MG/DL (ref 74–99)
GLUCOSE UR STRIP-MCNC: NEGATIVE MG/DL
HCT VFR BLD AUTO: 36.7 % (ref 34–48)
HGB BLD-MCNC: 11.9 G/DL (ref 11.5–15.5)
HGB UR QL STRIP.AUTO: NEGATIVE
IMM GRANULOCYTES # BLD AUTO: 0.05 K/UL (ref 0–0.58)
IMM GRANULOCYTES NFR BLD: 1 % (ref 0–5)
KETONES UR STRIP-MCNC: NEGATIVE MG/DL
LACTATE BLDV-SCNC: 1.5 MMOL/L (ref 0.5–2.2)
LEUKOCYTE ESTERASE UR QL STRIP: NEGATIVE
LYMPHOCYTES NFR BLD: 1.3 K/UL (ref 1.5–4)
LYMPHOCYTES RELATIVE PERCENT: 16 % (ref 20–42)
MCH RBC QN AUTO: 28.7 PG (ref 26–35)
MCHC RBC AUTO-ENTMCNC: 32.4 G/DL (ref 32–34.5)
MCV RBC AUTO: 88.4 FL (ref 80–99.9)
MONOCYTES NFR BLD: 0.65 K/UL (ref 0.1–0.95)
MONOCYTES NFR BLD: 8 % (ref 2–12)
NEUTROPHILS NFR BLD: 75 % (ref 43–80)
NEUTS SEG NFR BLD: 6.05 K/UL (ref 1.8–7.3)
NITRITE UR QL STRIP: NEGATIVE
PH UR STRIP: 6 [PH] (ref 5–9)
PLATELET # BLD AUTO: 210 K/UL (ref 130–450)
PMV BLD AUTO: 9.2 FL (ref 7–12)
POTASSIUM SERPL-SCNC: 3.7 MMOL/L (ref 3.5–5)
PROT SERPL-MCNC: 7.2 G/DL (ref 6.4–8.3)
PROT UR STRIP-MCNC: NEGATIVE MG/DL
RBC # BLD AUTO: 4.15 M/UL (ref 3.5–5.5)
RBC #/AREA URNS HPF: NORMAL /HPF
SODIUM SERPL-SCNC: 134 MMOL/L (ref 132–146)
SP GR UR STRIP: 1.01 (ref 1–1.03)
UROBILINOGEN UR STRIP-ACNC: 0.2 EU/DL (ref 0–1)
WBC #/AREA URNS HPF: NORMAL /HPF
WBC OTHER # BLD: 8.1 K/UL (ref 4.5–11.5)

## 2024-04-30 PROCEDURE — 80053 COMPREHEN METABOLIC PANEL: CPT

## 2024-04-30 PROCEDURE — 81001 URINALYSIS AUTO W/SCOPE: CPT

## 2024-04-30 PROCEDURE — 83605 ASSAY OF LACTIC ACID: CPT

## 2024-04-30 PROCEDURE — 36415 COLL VENOUS BLD VENIPUNCTURE: CPT

## 2024-04-30 PROCEDURE — 73502 X-RAY EXAM HIP UNI 2-3 VIEWS: CPT

## 2024-04-30 PROCEDURE — 72100 X-RAY EXAM L-S SPINE 2/3 VWS: CPT

## 2024-04-30 PROCEDURE — 6370000000 HC RX 637 (ALT 250 FOR IP): Performed by: PHYSICIAN ASSISTANT

## 2024-04-30 PROCEDURE — 99284 EMERGENCY DEPT VISIT MOD MDM: CPT

## 2024-04-30 PROCEDURE — 85025 COMPLETE CBC W/AUTO DIFF WBC: CPT

## 2024-04-30 RX ORDER — CYCLOBENZAPRINE HCL 5 MG
5 TABLET ORAL 3 TIMES DAILY PRN
Qty: 12 TABLET | Refills: 0 | Status: SHIPPED | OUTPATIENT
Start: 2024-04-30 | End: 2024-05-04

## 2024-04-30 RX ORDER — TIZANIDINE 4 MG/1
4 TABLET ORAL ONCE
Status: COMPLETED | OUTPATIENT
Start: 2024-04-30 | End: 2024-04-30

## 2024-04-30 RX ORDER — PREDNISONE 50 MG/1
50 TABLET ORAL DAILY
Qty: 5 TABLET | Refills: 0 | Status: SHIPPED | OUTPATIENT
Start: 2024-04-30 | End: 2024-05-05

## 2024-04-30 RX ADMIN — TIZANIDINE 4 MG: 4 TABLET ORAL at 13:32

## 2024-04-30 ASSESSMENT — LIFESTYLE VARIABLES
HOW MANY STANDARD DRINKS CONTAINING ALCOHOL DO YOU HAVE ON A TYPICAL DAY: PATIENT DOES NOT DRINK
HOW OFTEN DO YOU HAVE A DRINK CONTAINING ALCOHOL: NEVER

## 2024-04-30 ASSESSMENT — PAIN SCALES - GENERAL: PAINLEVEL_OUTOF10: 3

## 2024-04-30 ASSESSMENT — PAIN DESCRIPTION - ORIENTATION: ORIENTATION: RIGHT

## 2024-04-30 ASSESSMENT — PAIN DESCRIPTION - LOCATION: LOCATION: HIP

## 2024-04-30 NOTE — DISCHARGE INSTRUCTIONS
Follow-up with your previously established spinal surgeon within 1 week.   -  Discontinue current tramadol prescription.  -  Utilize prednisone taper for 5 days.  Then return to routine 5 mg prednisone dosing.  -

## 2024-04-30 NOTE — ED PROVIDER NOTES
Independent TATYANA Visit.       [facility]  Department of Emergency Medicine   ED  Encounter Note  Admit Date/RoomTime: 2024 12:55 PM  ED Room: [place] [address]    NAME: Blessing Campbell  : 1933  MRN: [id number]     Chief Complaint:  Hip Pain (Right side that radiates down left thigh. )    History of Present Illness   Patient's daughter is present and acts as supportive historian.  She reports she is the main caregiver for her mom      Blessing Campbell is a 89 y.o. old female who presents to the emergency department by private vehicle, for non-traumatic right back/hip pain which occured 3 week(s) prior to arrival.  The pain was result of no recent injury.  Patient reports that for the past few days the pain has been increasing.  Daughter endorses this information stating that she does live at home alone and has home health for a few hours a day, several times a week.  She states that she has been staying with her mother due to her discomfort.  She is also been seen by physical therapist and PCP.  She was diagnosed with some right-sided sciatica and was started on Tylenol, Motrin, Toradol, and was given a Decadron shot.  She reports that none of this seemed alleviate the pain.  Daughter endorsed that she does have some associated symptoms of increased urination several times nightly over the past few days which she has concerns for urinary tract infection.  Since onset the symptoms have been gradually worsening.  .  Her pain is aggraveated by certain movements or pressure on or palpation of painful area, relieved by nothing and associated with buttock pain and needing a walker to ambulate due to discomfort.  She denies any head injury, headache, loss of consciousness, neck pain, chest pain, abdominal pain, extremity injury, numbness, weakness, blurred vision, vomiting, fever, chills, wounds, or rash.  Patient's daughter reports that both herself and her sister have been staying with the mother for the past

## 2024-04-30 NOTE — ED NOTES
Patient assisted into wheelchair for discharge. Patient drowsy but can arouse. Patient reassessed by MARIA INES Moreno. Patient cleared for discharge.

## 2024-05-06 ENCOUNTER — APPOINTMENT (OUTPATIENT)
Dept: CT IMAGING | Age: 89
DRG: 543 | End: 2024-05-06
Payer: MEDICARE

## 2024-05-06 ENCOUNTER — HOSPITAL ENCOUNTER (INPATIENT)
Age: 89
LOS: 3 days | Discharge: NURSING, REHAB OR LONG-TERM CARE | DRG: 543 | End: 2024-05-10
Attending: EMERGENCY MEDICINE | Admitting: INTERNAL MEDICINE
Payer: MEDICARE

## 2024-05-06 DIAGNOSIS — S32.10XA CLOSED FRACTURE OF SACRUM, UNSPECIFIED PORTION OF SACRUM, INITIAL ENCOUNTER (HCC): Primary | ICD-10-CM

## 2024-05-06 DIAGNOSIS — S32.009A CLOSED FRACTURE OF TRANSVERSE PROCESS OF LUMBAR VERTEBRA, INITIAL ENCOUNTER (HCC): ICD-10-CM

## 2024-05-06 PROBLEM — M54.9 INTRACTABLE BACK PAIN: Status: ACTIVE | Noted: 2024-05-06

## 2024-05-06 LAB
ANION GAP SERPL CALCULATED.3IONS-SCNC: 8 MMOL/L (ref 7–16)
BASOPHILS # BLD: 0.01 K/UL (ref 0–0.2)
BASOPHILS NFR BLD: 0 % (ref 0–2)
BUN SERPL-MCNC: 24 MG/DL (ref 6–23)
CALCIUM SERPL-MCNC: 8.7 MG/DL (ref 8.6–10.2)
CHLORIDE SERPL-SCNC: 103 MMOL/L (ref 98–107)
CO2 SERPL-SCNC: 26 MMOL/L (ref 22–29)
CREAT SERPL-MCNC: 0.5 MG/DL (ref 0.5–1)
EOSINOPHIL # BLD: 0.04 K/UL (ref 0.05–0.5)
EOSINOPHILS RELATIVE PERCENT: 0 % (ref 0–6)
ERYTHROCYTE [DISTWIDTH] IN BLOOD BY AUTOMATED COUNT: 15.2 % (ref 11.5–15)
GFR, ESTIMATED: 87 ML/MIN/1.73M2
GLUCOSE SERPL-MCNC: 86 MG/DL (ref 74–99)
HCT VFR BLD AUTO: 37.3 % (ref 34–48)
HGB BLD-MCNC: 12 G/DL (ref 11.5–15.5)
IMM GRANULOCYTES # BLD AUTO: 0.06 K/UL (ref 0–0.58)
IMM GRANULOCYTES NFR BLD: 1 % (ref 0–5)
LYMPHOCYTES NFR BLD: 2.12 K/UL (ref 1.5–4)
LYMPHOCYTES RELATIVE PERCENT: 19 % (ref 20–42)
MCH RBC QN AUTO: 29.3 PG (ref 26–35)
MCHC RBC AUTO-ENTMCNC: 32.2 G/DL (ref 32–34.5)
MCV RBC AUTO: 91 FL (ref 80–99.9)
MONOCYTES NFR BLD: 0.71 K/UL (ref 0.1–0.95)
MONOCYTES NFR BLD: 6 % (ref 2–12)
NEUTROPHILS NFR BLD: 74 % (ref 43–80)
NEUTS SEG NFR BLD: 8.22 K/UL (ref 1.8–7.3)
PLATELET # BLD AUTO: 297 K/UL (ref 130–450)
PMV BLD AUTO: 8.6 FL (ref 7–12)
POTASSIUM SERPL-SCNC: 3.3 MMOL/L (ref 3.5–5)
RBC # BLD AUTO: 4.1 M/UL (ref 3.5–5.5)
SODIUM SERPL-SCNC: 137 MMOL/L (ref 132–146)
WBC OTHER # BLD: 11.2 K/UL (ref 4.5–11.5)

## 2024-05-06 PROCEDURE — 80048 BASIC METABOLIC PNL TOTAL CA: CPT

## 2024-05-06 PROCEDURE — 72192 CT PELVIS W/O DYE: CPT

## 2024-05-06 PROCEDURE — 72131 CT LUMBAR SPINE W/O DYE: CPT

## 2024-05-06 PROCEDURE — 87086 URINE CULTURE/COLONY COUNT: CPT

## 2024-05-06 PROCEDURE — G0378 HOSPITAL OBSERVATION PER HR: HCPCS

## 2024-05-06 PROCEDURE — 96376 TX/PRO/DX INJ SAME DRUG ADON: CPT

## 2024-05-06 PROCEDURE — 85025 COMPLETE CBC W/AUTO DIFF WBC: CPT

## 2024-05-06 PROCEDURE — 99222 1ST HOSP IP/OBS MODERATE 55: CPT | Performed by: INTERNAL MEDICINE

## 2024-05-06 PROCEDURE — 6360000002 HC RX W HCPCS: Performed by: EMERGENCY MEDICINE

## 2024-05-06 PROCEDURE — 81001 URINALYSIS AUTO W/SCOPE: CPT

## 2024-05-06 PROCEDURE — 99285 EMERGENCY DEPT VISIT HI MDM: CPT

## 2024-05-06 PROCEDURE — 96374 THER/PROPH/DIAG INJ IV PUSH: CPT

## 2024-05-06 PROCEDURE — 6360000002 HC RX W HCPCS: Performed by: INTERNAL MEDICINE

## 2024-05-06 RX ORDER — ONDANSETRON 2 MG/ML
4 INJECTION INTRAMUSCULAR; INTRAVENOUS EVERY 6 HOURS PRN
Status: DISCONTINUED | OUTPATIENT
Start: 2024-05-06 | End: 2024-05-10 | Stop reason: HOSPADM

## 2024-05-06 RX ORDER — AMLODIPINE BESYLATE 5 MG/1
5 TABLET ORAL 2 TIMES DAILY
Status: DISCONTINUED | OUTPATIENT
Start: 2024-05-07 | End: 2024-05-10 | Stop reason: HOSPADM

## 2024-05-06 RX ORDER — SODIUM CHLORIDE 9 MG/ML
INJECTION, SOLUTION INTRAVENOUS PRN
Status: DISCONTINUED | OUTPATIENT
Start: 2024-05-06 | End: 2024-05-10 | Stop reason: HOSPADM

## 2024-05-06 RX ORDER — ACETAMINOPHEN 325 MG/1
650 TABLET ORAL EVERY 6 HOURS PRN
Status: DISCONTINUED | OUTPATIENT
Start: 2024-05-06 | End: 2024-05-10 | Stop reason: HOSPADM

## 2024-05-06 RX ORDER — ACETAMINOPHEN 650 MG/1
650 SUPPOSITORY RECTAL EVERY 6 HOURS PRN
Status: DISCONTINUED | OUTPATIENT
Start: 2024-05-06 | End: 2024-05-10 | Stop reason: HOSPADM

## 2024-05-06 RX ORDER — PROMETHAZINE HYDROCHLORIDE 25 MG/1
12.5 TABLET ORAL EVERY 6 HOURS PRN
Status: DISCONTINUED | OUTPATIENT
Start: 2024-05-06 | End: 2024-05-10 | Stop reason: HOSPADM

## 2024-05-06 RX ORDER — MORPHINE SULFATE 2 MG/ML
2 INJECTION, SOLUTION INTRAMUSCULAR; INTRAVENOUS
Status: DISCONTINUED | OUTPATIENT
Start: 2024-05-06 | End: 2024-05-10 | Stop reason: HOSPADM

## 2024-05-06 RX ORDER — SODIUM CHLORIDE 0.9 % (FLUSH) 0.9 %
10 SYRINGE (ML) INJECTION EVERY 12 HOURS SCHEDULED
Status: DISCONTINUED | OUTPATIENT
Start: 2024-05-06 | End: 2024-05-10 | Stop reason: HOSPADM

## 2024-05-06 RX ORDER — ENOXAPARIN SODIUM 100 MG/ML
40 INJECTION SUBCUTANEOUS DAILY
Status: DISCONTINUED | OUTPATIENT
Start: 2024-05-06 | End: 2024-05-06

## 2024-05-06 RX ORDER — SODIUM CHLORIDE 0.9 % (FLUSH) 0.9 %
10 SYRINGE (ML) INJECTION PRN
Status: DISCONTINUED | OUTPATIENT
Start: 2024-05-06 | End: 2024-05-10 | Stop reason: HOSPADM

## 2024-05-06 RX ORDER — POLYETHYLENE GLYCOL 3350 17 G/17G
17 POWDER, FOR SOLUTION ORAL DAILY PRN
Status: DISCONTINUED | OUTPATIENT
Start: 2024-05-06 | End: 2024-05-10 | Stop reason: HOSPADM

## 2024-05-06 RX ORDER — METOPROLOL SUCCINATE 25 MG/1
25 TABLET, EXTENDED RELEASE ORAL DAILY
Status: DISCONTINUED | OUTPATIENT
Start: 2024-05-07 | End: 2024-05-07 | Stop reason: DRUGHIGH

## 2024-05-06 RX ORDER — MORPHINE SULFATE 4 MG/ML
4 INJECTION, SOLUTION INTRAMUSCULAR; INTRAVENOUS ONCE
Status: COMPLETED | OUTPATIENT
Start: 2024-05-06 | End: 2024-05-06

## 2024-05-06 RX ORDER — MORPHINE SULFATE 4 MG/ML
4 INJECTION, SOLUTION INTRAMUSCULAR; INTRAVENOUS
Status: DISCONTINUED | OUTPATIENT
Start: 2024-05-06 | End: 2024-05-06 | Stop reason: DRUGHIGH

## 2024-05-06 RX ORDER — LOSARTAN POTASSIUM 100 MG/1
100 TABLET ORAL DAILY
Status: DISCONTINUED | OUTPATIENT
Start: 2024-05-07 | End: 2024-05-10 | Stop reason: HOSPADM

## 2024-05-06 RX ADMIN — MORPHINE SULFATE 2 MG: 2 INJECTION, SOLUTION INTRAMUSCULAR; INTRAVENOUS at 22:57

## 2024-05-06 RX ADMIN — MORPHINE SULFATE 4 MG: 4 INJECTION, SOLUTION INTRAMUSCULAR; INTRAVENOUS at 20:42

## 2024-05-06 ASSESSMENT — PAIN DESCRIPTION - DESCRIPTORS
DESCRIPTORS: ACHING;THROBBING;SHOOTING
DESCRIPTORS: ACHING;THROBBING;SHOOTING

## 2024-05-06 ASSESSMENT — PAIN DESCRIPTION - ONSET
ONSET: ON-GOING
ONSET: ON-GOING

## 2024-05-06 ASSESSMENT — PAIN SCALES - GENERAL
PAINLEVEL_OUTOF10: 5
PAINLEVEL_OUTOF10: 6
PAINLEVEL_OUTOF10: 8
PAINLEVEL_OUTOF10: 3

## 2024-05-06 ASSESSMENT — ENCOUNTER SYMPTOMS
CHEST TIGHTNESS: 0
ABDOMINAL PAIN: 0
SHORTNESS OF BREATH: 0
BACK PAIN: 1

## 2024-05-06 ASSESSMENT — PAIN DESCRIPTION - LOCATION
LOCATION: BACK

## 2024-05-06 ASSESSMENT — PAIN DESCRIPTION - FREQUENCY
FREQUENCY: CONTINUOUS
FREQUENCY: CONTINUOUS

## 2024-05-06 ASSESSMENT — PAIN - FUNCTIONAL ASSESSMENT
PAIN_FUNCTIONAL_ASSESSMENT: PREVENTS OR INTERFERES SOME ACTIVE ACTIVITIES AND ADLS
PAIN_FUNCTIONAL_ASSESSMENT: PREVENTS OR INTERFERES SOME ACTIVE ACTIVITIES AND ADLS

## 2024-05-06 ASSESSMENT — PAIN DESCRIPTION - PAIN TYPE
TYPE: ACUTE PAIN
TYPE: ACUTE PAIN

## 2024-05-06 ASSESSMENT — PAIN DESCRIPTION - ORIENTATION
ORIENTATION: LOWER
ORIENTATION: LOWER

## 2024-05-06 NOTE — ED PROVIDER NOTES
89-year-old female presenting with back pain.  She has longstanding history of osteoporosis, prior kyphoplasty's.  In the last 3 weeks the pain is changed and it has been very disruptive to her life.  She has not slept well, has had a number of ER and family doctor visits.  She lives at home alone but the daughter who is with her states that she will probably need placement for rehabilitation facility.  At this point the goal is for comfort and not for quantity at this point.  Patient is awake and alert and answer some questions but daughter provides most of the independent history      History reviewed. No pertinent family history.  Past Surgical History:   Procedure Laterality Date    BACK SURGERY      compression fx    BREAST SURGERY      lumpectomy right benign    CARDIAC SURGERY      heart catheterization    CATARACT REMOVAL WITH IMPLANT  10/08/2012    left    CATARACT REMOVAL WITH IMPLANT  01/07/2013    right     COLONOSCOPY      EYE SURGERY      HYSTERECTOMY (CERVIX STATUS UNKNOWN)      MOHS SURGERY N/A     OTHER SURGICAL HISTORY  03/19/2015    I and D of right vulva abcess    OTHER SURGICAL HISTORY  03/19/2015    i&D of vulva    SKIN BIOPSY      VARICOSE VEIN SURGERY      bilateral        Review of Systems   Constitutional:  Negative for fever.   Respiratory:  Negative for chest tightness and shortness of breath.    Cardiovascular:  Negative for chest pain.   Gastrointestinal:  Negative for abdominal pain.   Musculoskeletal:  Positive for back pain.        Physical Exam  Constitutional:       General: She is not in acute distress.     Appearance: She is well-developed.   HENT:      Head: Normocephalic and atraumatic.   Eyes:      Conjunctiva/sclera: Conjunctivae normal.      Pupils: Pupils are equal, round, and reactive to light.   Neck:      Thyroid: No thyromegaly.   Cardiovascular:      Rate and Rhythm: Normal rate and regular rhythm.   Pulmonary:      Effort: Pulmonary effort is normal. No respiratory

## 2024-05-07 PROBLEM — M19.90 ARTHRITIS: Status: ACTIVE | Noted: 2024-05-07

## 2024-05-07 PROBLEM — Z86.718 HX OF BLOOD CLOTS: Status: ACTIVE | Noted: 2024-05-07

## 2024-05-07 PROBLEM — R26.2 UNABLE TO AMBULATE: Status: ACTIVE | Noted: 2024-05-07

## 2024-05-07 LAB
BACTERIA URNS QL MICRO: ABNORMAL
BASOPHILS # BLD: 0.01 K/UL (ref 0–0.2)
BASOPHILS NFR BLD: 0 % (ref 0–2)
BILIRUB UR QL STRIP: NEGATIVE
CLARITY UR: CLEAR
COLOR UR: YELLOW
EOSINOPHIL # BLD: 0.04 K/UL (ref 0.05–0.5)
EOSINOPHILS RELATIVE PERCENT: 0 % (ref 0–6)
ERYTHROCYTE [DISTWIDTH] IN BLOOD BY AUTOMATED COUNT: 15.2 % (ref 11.5–15)
GLUCOSE UR STRIP-MCNC: NEGATIVE MG/DL
HCT VFR BLD AUTO: 35.8 % (ref 34–48)
HGB BLD-MCNC: 11.2 G/DL (ref 11.5–15.5)
HGB UR QL STRIP.AUTO: ABNORMAL
IMM GRANULOCYTES # BLD AUTO: 0.05 K/UL (ref 0–0.58)
IMM GRANULOCYTES NFR BLD: 1 % (ref 0–5)
KETONES UR STRIP-MCNC: NEGATIVE MG/DL
LEUKOCYTE ESTERASE UR QL STRIP: NEGATIVE
LYMPHOCYTES NFR BLD: 2.05 K/UL (ref 1.5–4)
LYMPHOCYTES RELATIVE PERCENT: 23 % (ref 20–42)
MCH RBC QN AUTO: 28.8 PG (ref 26–35)
MCHC RBC AUTO-ENTMCNC: 31.3 G/DL (ref 32–34.5)
MCV RBC AUTO: 92 FL (ref 80–99.9)
MONOCYTES NFR BLD: 0.61 K/UL (ref 0.1–0.95)
MONOCYTES NFR BLD: 7 % (ref 2–12)
NEUTROPHILS NFR BLD: 69 % (ref 43–80)
NEUTS SEG NFR BLD: 6.18 K/UL (ref 1.8–7.3)
NITRITE UR QL STRIP: NEGATIVE
PH UR STRIP: 6.5 [PH] (ref 5–9)
PLATELET # BLD AUTO: 292 K/UL (ref 130–450)
PMV BLD AUTO: 9 FL (ref 7–12)
PROT UR STRIP-MCNC: NEGATIVE MG/DL
RBC # BLD AUTO: 3.89 M/UL (ref 3.5–5.5)
RBC #/AREA URNS HPF: ABNORMAL /HPF
SP GR UR STRIP: 1.01 (ref 1–1.03)
UROBILINOGEN UR STRIP-ACNC: 0.2 EU/DL (ref 0–1)
WBC #/AREA URNS HPF: ABNORMAL /HPF
WBC OTHER # BLD: 8.9 K/UL (ref 4.5–11.5)

## 2024-05-07 PROCEDURE — G0378 HOSPITAL OBSERVATION PER HR: HCPCS

## 2024-05-07 PROCEDURE — 6370000000 HC RX 637 (ALT 250 FOR IP): Performed by: INTERNAL MEDICINE

## 2024-05-07 PROCEDURE — 85025 COMPLETE CBC W/AUTO DIFF WBC: CPT

## 2024-05-07 PROCEDURE — 99232 SBSQ HOSP IP/OBS MODERATE 35: CPT | Performed by: INTERNAL MEDICINE

## 2024-05-07 PROCEDURE — 36415 COLL VENOUS BLD VENIPUNCTURE: CPT

## 2024-05-07 PROCEDURE — 97530 THERAPEUTIC ACTIVITIES: CPT

## 2024-05-07 PROCEDURE — 97165 OT EVAL LOW COMPLEX 30 MIN: CPT

## 2024-05-07 PROCEDURE — 2580000003 HC RX 258: Performed by: INTERNAL MEDICINE

## 2024-05-07 PROCEDURE — 97161 PT EVAL LOW COMPLEX 20 MIN: CPT | Performed by: PHYSICAL THERAPIST

## 2024-05-07 PROCEDURE — 97535 SELF CARE MNGMENT TRAINING: CPT

## 2024-05-07 PROCEDURE — 1200000000 HC SEMI PRIVATE

## 2024-05-07 PROCEDURE — 97530 THERAPEUTIC ACTIVITIES: CPT | Performed by: PHYSICAL THERAPIST

## 2024-05-07 RX ORDER — METOPROLOL SUCCINATE 25 MG/1
25 TABLET, EXTENDED RELEASE ORAL ONCE
Status: COMPLETED | OUTPATIENT
Start: 2024-05-07 | End: 2024-05-07

## 2024-05-07 RX ORDER — OXYCODONE HYDROCHLORIDE AND ACETAMINOPHEN 5; 325 MG/1; MG/1
1 TABLET ORAL EVERY 4 HOURS PRN
Status: DISCONTINUED | OUTPATIENT
Start: 2024-05-07 | End: 2024-05-10 | Stop reason: HOSPADM

## 2024-05-07 RX ORDER — PREDNISONE 5 MG/1
5 TABLET ORAL DAILY
Status: DISCONTINUED | OUTPATIENT
Start: 2024-05-07 | End: 2024-05-10 | Stop reason: HOSPADM

## 2024-05-07 RX ORDER — METOPROLOL SUCCINATE 50 MG/1
75 TABLET, EXTENDED RELEASE ORAL DAILY
Status: DISCONTINUED | OUTPATIENT
Start: 2024-05-07 | End: 2024-05-10 | Stop reason: HOSPADM

## 2024-05-07 RX ADMIN — OXYCODONE AND ACETAMINOPHEN 1 TABLET: 5; 325 TABLET ORAL at 12:53

## 2024-05-07 RX ADMIN — SERTRALINE 50 MG: 50 TABLET, FILM COATED ORAL at 07:59

## 2024-05-07 RX ADMIN — ACETAMINOPHEN 650 MG: 325 TABLET ORAL at 08:07

## 2024-05-07 RX ADMIN — Medication 10 ML: at 22:24

## 2024-05-07 RX ADMIN — METOPROLOL SUCCINATE 25 MG: 25 TABLET, FILM COATED, EXTENDED RELEASE ORAL at 10:23

## 2024-05-07 RX ADMIN — APIXABAN 2.5 MG: 2.5 TABLET, FILM COATED ORAL at 07:59

## 2024-05-07 RX ADMIN — PREDNISONE 5 MG: 5 TABLET ORAL at 07:58

## 2024-05-07 RX ADMIN — APIXABAN 2.5 MG: 2.5 TABLET, FILM COATED ORAL at 22:24

## 2024-05-07 RX ADMIN — LOSARTAN POTASSIUM 100 MG: 100 TABLET, FILM COATED ORAL at 07:58

## 2024-05-07 RX ADMIN — AMLODIPINE BESYLATE 5 MG: 5 TABLET ORAL at 07:59

## 2024-05-07 RX ADMIN — Medication 2 TABLET: at 07:58

## 2024-05-07 ASSESSMENT — PAIN SCALES - GENERAL
PAINLEVEL_OUTOF10: 7
PAINLEVEL_OUTOF10: 2
PAINLEVEL_OUTOF10: 1
PAINLEVEL_OUTOF10: 5

## 2024-05-07 ASSESSMENT — PAIN SCALES - WONG BAKER: WONGBAKER_NUMERICALRESPONSE: HURTS A LITTLE BIT

## 2024-05-07 ASSESSMENT — PAIN DESCRIPTION - LOCATION
LOCATION: BACK
LOCATION: BACK

## 2024-05-07 ASSESSMENT — PAIN DESCRIPTION - PAIN TYPE
TYPE: ACUTE PAIN
TYPE: ACUTE PAIN

## 2024-05-07 ASSESSMENT — PAIN DESCRIPTION - ORIENTATION
ORIENTATION: LOWER
ORIENTATION: LOWER

## 2024-05-07 NOTE — PLAN OF CARE
Problem: Discharge Planning  Goal: Discharge to home or other facility with appropriate resources  Outcome: Progressing     Problem: ABCDS Injury Assessment  Goal: Absence of physical injury  Outcome: Progressing     Problem: Safety - Adult  Goal: Free from fall injury  Outcome: Progressing     Problem: Pain  Goal: Verbalizes/displays adequate comfort level or baseline comfort level  Outcome: Progressing     Problem: Chronic Conditions and Co-morbidities  Goal: Patient's chronic conditions and co-morbidity symptoms are monitored and maintained or improved  Outcome: Progressing

## 2024-05-07 NOTE — PROGRESS NOTES
Admitting Date and Time: 5/6/2024  5:15 PM  Admit Dx: Intractable back pain [M54.9]    Subjective:    Pt feels pain unable to ambulate  Per RN: no new issues    ROS: denies fever, chills, cp, sob, n/v, HA unless stated above.     predniSONE  5 mg Oral Daily    oyster shell calcium w/D  2 tablet Oral TID    sodium chloride flush  10 mL IntraVENous 2 times per day    amLODIPine  5 mg Oral BID    losartan  100 mg Oral Daily    metoprolol succinate  25 mg Oral Daily    apixaban  2.5 mg Oral BID    sertraline  50 mg Oral Daily     sodium chloride flush, 10 mL, PRN  sodium chloride, , PRN  promethazine, 12.5 mg, Q6H PRN   Or  ondansetron, 4 mg, Q6H PRN  polyethylene glycol, 17 g, Daily PRN  acetaminophen, 650 mg, Q6H PRN   Or  acetaminophen, 650 mg, Q6H PRN  morphine, 2 mg, Q3H PRN         Objective:    /85   Pulse 92   Temp 98.1 °F (36.7 °C) (Oral)   Resp 17   Ht 1.549 m (5' 1\")   Wt 52.2 kg (115 lb)   SpO2 93%   BMI 21.73 kg/m²   General Appearance: alert and oriented to person, place and time and in no acute distress  Skin: warm and dry  Head: normocephalic and atraumatic  Eyes: pupils equal, round, and reactive to light, extraocular eye movements intact, conjunctivae normal  Neck: neck supple and non tender without mass   Pulmonary/Chest: clear to auscultation bilaterally- no wheezes, rales or rhonchi, normal air movement, no respiratory distress  Cardiovascular: normal rate, normal S1 and S2 and no carotid bruits  Abdomen: soft, non-tender, non-distended, normal bowel sounds, no masses or organomegaly  Extremities: no cyanosis, no clubbing and no  edema  Neurologic: no cranial nerve deficit and speech normal      Recent Labs     05/06/24  1906      K 3.3*      CO2 26   BUN 24*   CREATININE 0.5   GLUCOSE 86   CALCIUM 8.7       No results for input(s): \"ALKPHOS\", \"PROT\", \"LABALBU\", \"BILITOT\", \"AST\", \"ALT\" in the last 72 hours.    Recent Labs     05/06/24  1906 05/07/24  0446   WBC 11.2 8.9    RBC 4.10 3.89   HGB 12.0 11.2*   HCT 37.3 35.8   MCV 91.0 92.0   MCH 29.3 28.8   MCHC 32.2 31.3*   RDW 15.2* 15.2*    292   MPV 8.6 9.0           Radiology:   CT LUMBAR SPINE WO CONTRAST   Final Result   1. Acute fracture in the right paracentral aspect of the sacrum.   2. Acute nondisplaced fracture in the left L5 transverse process.   3. Chronic compression fractures with kyphoplasty changes in the T10, T11 and   L3 vertebral bodies.   4. Moderate dextroscoliotic curvature of the thoracolumbar spine.   5. Left nephrolithiasis.  No hydronephrosis.         CT PELVIS WO CONTRAST Additional Contrast? None   Final Result   1. Acute nondisplaced fracture with an oblique to craniocaudal orientation   involving S1-S2 on the right side.   2. Fracture of the transverse process of L5 on the left.             Assessment:  Principal Problem:    Intractable back pain  Active Problems:    Primary biliary cirrhosis (HCC)    Hx of blood clots    Arthritis  Resolved Problems:    * No resolved hospital problems. *      Plan: History of present illness from History and Physical:  This is a 89 y.o. female  has a past medical history of Arthritis, Atrial fibrillation (HCC), Cancer (HCC), Hx of blood clots, Hypertension, and Primary biliary cirrhosis (HCC). presented with Intractable back pain, unable to ambulate  for last few days prior to arrival to the hospital.  Her long standing back pain is now getting more persistent. Exacerbated by general activity or exertion. Now unable to ambulate. On direct questioning, patient denied any  resting ongoing chest pain, resting SOB, hemoptysis, productive cough, fever, ongoing palpitation, active abdominal pain, hematemesis, rectal bleeding, radha, hematuria, any other  and GI complaints, and any new focal neuro deficits.     Acute on chronic back pain due to fractures of sacrum and transverse process of L5.  PT OT social work for placement analgesia via iv morphine  A-fib rate  control medications and systemic anticoagulation with Eliquis  Hypertension monitor on home meds  Chronic arthritis on steroids of prednisone 5 daily complicated with osteoporosis on vitamin D and calcium  Hypokalemia: recheck bmp  Mood disorder continue Zoloft only at 50  DVT prophylaxis: Eliquis  Full code.  Disposition: Placement.  She prefers home but understands placement likely needed    NOTE: This report was transcribed using voice recognition software. Every effort was made to ensure accuracy; however, inadvertent computerized transcription errors may be present.     Electronically signed by BANDAR CHRISTIANSEN MD on 5/7/2024 at 8:01 AM

## 2024-05-07 NOTE — PROGRESS NOTES
Spoke with ED attending who wanted advice on patient prognosis.  Patient had a fall and has L5 transverse process fracture as well as S1 zone 3 sacral fracture that is minimally displaced and is in neurovascularly intact.  It was discussed with him that these fractures are nonoperative and patient may weight-bear as tolerated with physical therapy.  He stated patient is being admitted for placement.  ED attending informed us that official consult will not be placed due to this admittance only being placed for placement and this is nonoperative treatment.  All parties are agreeable in this plan.    Electronically signed by Heath Vera DO on 5/6/2024 at 8:24 PM

## 2024-05-07 NOTE — PROGRESS NOTES
OCCUPATIONAL THERAPY INITIAL EVALUATION    [facility]  [address] [place]        Date:2024                                                  Patient Name: Blessing Campbell    MRN: [id number]    : 1933    Room: [address] [place] [address]      Evaluating OT: Cass Gaming OTR/L; [id number]     Referring Provider and Specific Provider Orders/Date:      24  OT eval and treat  Start:  24,   End:  24,   ONE TIME,   Standing Count:  1 Occurrences,   R         Jeanette Granados MD      Placement Recommendation: Subacute        Diagnosis:   1. Closed fracture of sacrum, unspecified portion of sacrum, initial encounter ([facility])    2. Closed fracture of transverse process of lumbar vertebra, initial encounter ([facility])         Surgery: none       Pertinent Medical History:       Past Medical History:   Diagnosis Date    Arthritis     Atrial fibrillation (HCC)     Cancer (HCC)     skin    Hx of blood clots     thromophlebitis yrs ago    Hypertension     Primary biliary cirrhosis (HCC)          Past Surgical History:   Procedure Laterality Date    BACK SURGERY      compression fx    BREAST SURGERY      lumpectomy right benign    CARDIAC SURGERY      heart catheterization    CATARACT REMOVAL WITH IMPLANT  10/08/2012    left    CATARACT REMOVAL WITH IMPLANT  2013    right     COLONOSCOPY      EYE SURGERY      HYSTERECTOMY (CERVIX STATUS UNKNOWN)      MOHS SURGERY N/A     OTHER SURGICAL HISTORY  2015    I and D of right vulva abcess    OTHER SURGICAL HISTORY  2015    i&D of vulva    SKIN BIOPSY      VARICOSE VEIN SURGERY      bilateral       Precautions:  Fall Risk, Acute fracture in the right paracentral aspect of the sacrum, Acute nondisplaced fracture in the left L5 transverse process.      Assessment of current deficits:     [x] Functional mobility  [x]ADLs  [x] Strength               []Cognition    [x] Functional transfers   [x]

## 2024-05-07 NOTE — PROGRESS NOTES
Physical Therapy Initial Evaluation/Plan of Care    Room #:  0322/0322-02  Patient Name: Blessing Campbell  YOB: 1933  MRN: [id number]    Date of Service: 5/7/2024     Tentative placement recommendation: Subacute Rehab  Equipment recommendation: To be determined      Evaluating Physical Therapist: Veena Lees, PT #[id number]      Specific Provider Orders/Date/Referring Provider :  05/07/24 0400    PT eval and treat  Start:  05/07/24 0400,   End:  05/07/24 0400,   ONE TIME,   Standing Count:  1 Occurrences,   R       Jeanette Granados MD    Admitting Diagnosis:   Intractable back pain [M54.9]  Unable to ambulate [R26.2]      unable to ambulate  for last few days prior to arrival to the hospital.  Her long standing back pain is now getting more persistent.  Surgery: none  Visit Diagnoses         Codes    Closed fracture of sacrum, unspecified portion of sacrum, initial encounter ([facility])    -  Primary S32.10XA    Closed fracture of transverse process of lumbar vertebra, initial encounter ([facility])     S32.009A            Patient Active Problem List   Diagnosis    Atrial fibrillation (HCC)    Hypertension    Primary biliary cirrhosis (HCC)    Syncope due to orthostatic hypotension    Intractable back pain    Hx of blood clots    Arthritis    Unable to ambulate        ASSESSMENT of Current Deficits Patient exhibits decreased strength, balance, and endurance impairing functional mobility, transfers, gait , gait distance, and tolerance to activity slow arielle, patient pleasant and motivated. Patient requires continued skilled physical therapy to address concerns listed above for increased safety and function at discharge.         PHYSICAL THERAPY  PLAN OF CARE       Physical therapy plan of care is established based on physician order,  patient diagnosis and clinical assessment    Current Treatment Recommendations:    -Bed Mobility: Lower and upper extremity exercises, and trunk control activities and log  roll  -Sitting Balance: Incorporate reaching activities to activate trunk muscles   -Standing Balance: Perform strengthening exercises in standing to promote motor control with or without upper extremity support  and Instruct patient on adequate base of support to maintain balance  -Transfers: Provide instruction on proper hand and foot position for adequate transfer of weight onto lower extremities and use of gait device if needed, Cues for hand placement, technique and safety. Provide stabilization to prevent fall , Facilitate weight shift forward on to lower extremities and provide necessary stabilization of bilateral lower extremities , and Assist with extension of knees trunk and hip to accept weight transfer   -Gait: Gait training, Standing activities to improve: base of support, weight shift, weight bearing , and Pregait training to emphasize: Sequencing , Device control, Upright, and Safety   -Endurance: Utilize Supervised activities to increase level of endurance to allow for safe functional mobility including transfers and gait     PT long term treatment goals are located in below grid    Patient and or family understand(s) diagnosis, prognosis, and plan of care.    Frequency of treatments: Patient will be seen  daily.         Prior Level of Function: Patient ambulated independently    Rehab Potential: good   for baseline    Past medical history:   Past Medical History:   Diagnosis Date    Arthritis     Atrial fibrillation (HCC)     Cancer (HCC)     skin    Hx of blood clots     thromophlebitis yrs ago    Hypertension     Primary biliary cirrhosis (HCC)      Past Surgical History:   Procedure Laterality Date    BACK SURGERY      compression fx    BREAST SURGERY      lumpectomy right benign    CARDIAC SURGERY      heart catheterization    CATARACT REMOVAL WITH IMPLANT  10/08/2012    left    CATARACT REMOVAL WITH IMPLANT  01/07/2013    right     COLONOSCOPY      EYE SURGERY      HYSTERECTOMY (CERVIX STATUS  motion 10% of affected joints    Strength BUE:  refer to OT eval  RLE:  3+/5  LLE:  3+/5  Increase strength in affected mm groups by 1/3 grade   Balance Sitting EOB:  good -  Dynamic Standing:  fair wheeled walker   Sitting EOB:  good    Dynamic Standing: good -wheeled walker      Patient is Alert & Oriented x person, place, and situation and follows directions    Sensation:  Patient  denies numbness/tingling   Edema:  no   Endurance: fair       Vitals: room air   Blood Pressure at rest  Blood Pressure during session    Heart Rate at rest  Heart Rate during session 80   SPO2 at rest  %  SPO2 during session 95%     Patient education  Patient educated on role of Physical Therapy, risks of immobility, safety and plan of care,  importance of mobility while in hospital , ankle pumps, quad set and glut set for edema control, blood clot prevention, spinal precautions, and weight bearing status      Patient response to education:   Pt verbalized understanding Pt demonstrated skill Pt requires further education in this area   Yes Partial Yes      Treatment:  Patient practiced and was instructed/facilitated in the following treatment: Patient Rolled bilaterally for hygiene and bed linen change, cues for hip and knee flexion and reaching with upper extremity to assist with rolling.educated and perfomred log roll. assisted to edge of bed,    Sat edge of bed 5 minutes with Supervision  to increase dynamic sitting balance and activity tolerance. Stood with steps to bedside commode, stood for hygiene. ambulated in room, assisted up to chair, performed exercises        Therapeutic Exercises:  ankle pumps  x 20 reps.       At end of session, patient in chair with     call light and phone within reach,  all lines and tubes intact, nursing notified.      Patient would benefit from continued skilled Physical Therapy to improve functional independence and quality of life.         Patient's/ family goals   rehab    Time in  0848  Time

## 2024-05-07 NOTE — CARE COORDINATION
Case Management Assessment  Initial Evaluation    Date/Time of Evaluation: 5/7/2024 11:47 AM  Assessment Completed by: TEMO Youngblood    If patient is discharged prior to next notation, then this note serves as note for discharge by case management.    Patient Name: Blessing Campbell                   YOB: 1933  Diagnosis: Intractable back pain [M54.9]  Unable to ambulate [R26.2]                   Date / Time: 5/6/2024  5:15 PM    Patient Admission Status: Inpatient   Readmission Risk (Low < 19, Mod (19-27), High > 27): No data recorded  Current PCP: Reyes Lopez MD  PCP verified by CM? Yes    Chart Reviewed: Yes      History Provided by: Patient, Child/Family  Patient Orientation: Alert and Oriented    Patient Cognition: Alert    Hospitalization in the last 30 days (Readmission):  No    If yes, Readmission Assessment in  Navigator will be completed.    Advance Directives:      Code Status: Full Code   Patient's Primary Decision Maker is:      Primary Decision Maker: marah herman - Child - [phone]    Secondary Decision Maker: Khushi Eli - Child - [phone]    Discharge Planning:    Patient lives with: Alone Type of Home: House  Primary Care Giver: Self  Patient Support Systems include: Family Members, Children   Current Financial resources:    Current community resources:    Current services prior to admission: None            Current DME:              Type of Home Care services:  PT, OT    ADLS  Prior functional level: Independent in ADLs/IADLs, Assistance with the following:, Mobility  Current functional level: Other (see comment) (unknown- PT/OT evals pending)    PT AM-PAC: 12 /24  OT AM-PAC:   /24    Family can provide assistance at DC: Yes  Would you like Case Management to discuss the discharge plan with any other family members/significant others, and if so, who? Yes (pt's daughter- Marah)  Plans to Return to Present Housing: No  Other Identified Issues/Barriers to RETURNING to  dialogue that supports the patient's individualized plan of care/goals and shares the quality data associated with the providers was provided to:     Patient Representative Name:       The Patient and/or Patient Representative Agree with the Discharge Plan?      TEMO Youngblood  Case Management Department  Ph: [phone]

## 2024-05-07 NOTE — H&P
[facility] Hospitalist Group   HISTORY AND PHYSICAL EXAM      AUTHOR: Jeanette Granados MD PATIENT NAME: Blessing Campbell   PCP: Reyes Lopez MD  MRN: [id number], : 1933       CHIEF COMPLAINT / REASON FOR ADMISSION: Intractable back pain, unable to ambulate  HPI:   This is a 89 y.o. female  has a past medical history of Arthritis, Atrial fibrillation (HCC), Cancer (HCC), Hx of blood clots, Hypertension, and Primary biliary cirrhosis (HCC). presented with Intractable back pain, unable to ambulate  for last few days prior to arrival to the hospital.  Her long standing back pain is now getting more persistent. Exacerbated by general activity or exertion. Now unable to ambulate. On direct questioning, patient denied any  resting ongoing chest pain, resting SOB, hemoptysis, productive cough, fever, ongoing palpitation, active abdominal pain, hematemesis, rectal bleeding, radha, hematuria, any other  and GI complaints, and any new focal neuro deficits.    ROS:  Pertinent positives and negatives are noted in the HPI, all other systems are reviewed and negative    PMH:  Past Medical History:   Diagnosis Date    Arthritis     Atrial fibrillation (HCC)     Cancer (HCC)     skin    Hx of blood clots     thromophlebitis yrs ago    Hypertension     Primary biliary cirrhosis (HCC)        Surgical History:  Past Surgical History:   Procedure Laterality Date    BACK SURGERY      compression fx    BREAST SURGERY      lumpectomy right benign    CARDIAC SURGERY      heart catheterization    CATARACT REMOVAL WITH IMPLANT  10/08/2012    left    CATARACT REMOVAL WITH IMPLANT  2013    right     COLONOSCOPY      EYE SURGERY      HYSTERECTOMY (CERVIX STATUS UNKNOWN)      MOHS SURGERY N/A     OTHER SURGICAL HISTORY  2015    I and D of right vulva abcess    OTHER SURGICAL HISTORY  2015    i&D of vulva    SKIN BIOPSY      VARICOSE VEIN SURGERY      bilateral        Medications Prior to Admission:    Prior  92.0 80.0 - 99.9 fL    MCH 28.8 26.0 - 35.0 pg    MCHC 31.3 (L) 32.0 - 34.5 g/dL    RDW 15.2 (H) 11.5 - 15.0 %    Platelets 292 130 - 450 k/uL    MPV 9.0 7.0 - 12.0 fL    Neutrophils % 69 43.0 - 80.0 %    Lymphocytes % 23 20.0 - 42.0 %    Monocytes % 7 2.0 - 12.0 %    Eosinophils % 0 0 - 6 %    Basophils % 0 0.0 - 2.0 %    Immature Granulocytes % 1 0.0 - 5.0 %    Neutrophils Absolute 6.18 1.80 - 7.30 k/uL    Lymphocytes Absolute 2.05 1.50 - 4.00 k/uL    Monocytes Absolute 0.61 0.10 - 0.95 k/uL    Eosinophils Absolute 0.04 (L) 0.05 - 0.50 k/uL    Basophils Absolute 0.01 0.00 - 0.20 k/uL    Immature Granulocytes Absolute 0.05 0.00 - 0.58 k/uL   Urinalysis with Microscopic   Result Value Ref Range    Color, UA Yellow Yellow    Turbidity UA Clear Clear    Glucose, Ur NEGATIVE NEGATIVE mg/dL    Bilirubin, Urine NEGATIVE NEGATIVE    Ketones, Urine NEGATIVE NEGATIVE mg/dL    Specific Gravity, UA 1.015 1.005 - 1.030    Urine Hgb TRACE (A) NEGATIVE    pH, Urine 6.5 5.0 - 9.0    Protein, UA NEGATIVE NEGATIVE mg/dL    Urobilinogen, Urine 0.2 0.0 - 1.0 EU/dL    Nitrite, Urine NEGATIVE NEGATIVE    Leukocyte Esterase, Urine NEGATIVE NEGATIVE    WBC, UA 0 TO 5 0 TO 5 /HPF    RBC, UA 6 TO 9 (A) 0 TO 2 /HPF    Bacteria, UA TRACE (A) None     ED Course as of 05/07/24 0639   Mon May 06, 2024   2014 Spoke with Dr. Granados who will admit the patient [SO]      ED Course User Index  [SO] Raudel Worrell DO     Radiology: CT LUMBAR SPINE WO CONTRAST    Result Date: 5/6/2024  EXAMINATION: CT OF THE LUMBAR SPINE WITHOUT CONTRAST  5/6/2024 TECHNIQUE: CT of the lumbar spine was performed without the administration of intravenous contrast. Multiplanar reformatted images are provided for review. Adjustment of mA and/or kV according to patient size was utilized.  Automated exposure control, iterative reconstruction, and/or weight based adjustment of the mA/kV was utilized to reduce the radiation dose to as low as reasonably achievable. COMPARISON:

## 2024-05-07 NOTE — ACP (ADVANCE CARE PLANNING)
Advance Care Planning   Healthcare Decision Maker:    Primary Decision Maker: angel herman - Child - [phone]    Secondary Decision Maker: Khushi Eli - Child - [phone]    Click here to complete Healthcare Decision Makers including selection of the Healthcare Decision Maker Relationship (ie \"Primary\").  Today we documented Decision Maker(s) consistent with Legal Next of Kin hierarchy.

## 2024-05-08 LAB
ANION GAP SERPL CALCULATED.3IONS-SCNC: 6 MMOL/L (ref 7–16)
BUN SERPL-MCNC: 19 MG/DL (ref 6–23)
CALCIUM SERPL-MCNC: 8.6 MG/DL (ref 8.6–10.2)
CHLORIDE SERPL-SCNC: 104 MMOL/L (ref 98–107)
CO2 SERPL-SCNC: 29 MMOL/L (ref 22–29)
CREAT SERPL-MCNC: 0.7 MG/DL (ref 0.5–1)
GFR, ESTIMATED: 82 ML/MIN/1.73M2
GLUCOSE SERPL-MCNC: 83 MG/DL (ref 74–99)
MICROORGANISM SPEC CULT: ABNORMAL
POTASSIUM SERPL-SCNC: 3.3 MMOL/L (ref 3.5–5)
SODIUM SERPL-SCNC: 139 MMOL/L (ref 132–146)
SPECIMEN DESCRIPTION: ABNORMAL

## 2024-05-08 PROCEDURE — 36415 COLL VENOUS BLD VENIPUNCTURE: CPT

## 2024-05-08 PROCEDURE — 97530 THERAPEUTIC ACTIVITIES: CPT

## 2024-05-08 PROCEDURE — 6370000000 HC RX 637 (ALT 250 FOR IP): Performed by: INTERNAL MEDICINE

## 2024-05-08 PROCEDURE — 97110 THERAPEUTIC EXERCISES: CPT

## 2024-05-08 PROCEDURE — 2580000003 HC RX 258: Performed by: INTERNAL MEDICINE

## 2024-05-08 PROCEDURE — 97535 SELF CARE MNGMENT TRAINING: CPT

## 2024-05-08 PROCEDURE — 80048 BASIC METABOLIC PNL TOTAL CA: CPT

## 2024-05-08 PROCEDURE — 99232 SBSQ HOSP IP/OBS MODERATE 35: CPT | Performed by: INTERNAL MEDICINE

## 2024-05-08 PROCEDURE — 1200000000 HC SEMI PRIVATE

## 2024-05-08 RX ADMIN — METOPROLOL SUCCINATE 75 MG: 50 TABLET, EXTENDED RELEASE ORAL at 10:40

## 2024-05-08 RX ADMIN — OXYCODONE AND ACETAMINOPHEN 1 TABLET: 5; 325 TABLET ORAL at 16:25

## 2024-05-08 RX ADMIN — OXYCODONE AND ACETAMINOPHEN 1 TABLET: 5; 325 TABLET ORAL at 20:57

## 2024-05-08 RX ADMIN — PREDNISONE 5 MG: 5 TABLET ORAL at 10:40

## 2024-05-08 RX ADMIN — APIXABAN 2.5 MG: 2.5 TABLET, FILM COATED ORAL at 20:57

## 2024-05-08 RX ADMIN — SERTRALINE 50 MG: 50 TABLET, FILM COATED ORAL at 10:40

## 2024-05-08 RX ADMIN — OXYCODONE AND ACETAMINOPHEN 1 TABLET: 5; 325 TABLET ORAL at 11:06

## 2024-05-08 RX ADMIN — OXYCODONE AND ACETAMINOPHEN 1 TABLET: 5; 325 TABLET ORAL at 06:55

## 2024-05-08 RX ADMIN — OXYCODONE AND ACETAMINOPHEN 1 TABLET: 5; 325 TABLET ORAL at 00:00

## 2024-05-08 RX ADMIN — Medication 10 ML: at 21:01

## 2024-05-08 RX ADMIN — AMLODIPINE BESYLATE 5 MG: 5 TABLET ORAL at 10:39

## 2024-05-08 RX ADMIN — LOSARTAN POTASSIUM 100 MG: 100 TABLET, FILM COATED ORAL at 10:40

## 2024-05-08 RX ADMIN — APIXABAN 2.5 MG: 2.5 TABLET, FILM COATED ORAL at 10:40

## 2024-05-08 RX ADMIN — AMLODIPINE BESYLATE 5 MG: 5 TABLET ORAL at 20:57

## 2024-05-08 ASSESSMENT — PAIN DESCRIPTION - DESCRIPTORS
DESCRIPTORS: ACHING
DESCRIPTORS: ACHING;DISCOMFORT;SORE
DESCRIPTORS: ACHING

## 2024-05-08 ASSESSMENT — PAIN DESCRIPTION - LOCATION
LOCATION: BACK

## 2024-05-08 ASSESSMENT — PAIN DESCRIPTION - ORIENTATION
ORIENTATION: MID;RIGHT;LEFT
ORIENTATION: RIGHT;LEFT;LOWER
ORIENTATION: OTHER (COMMENT)
ORIENTATION: RIGHT;LEFT;LOWER

## 2024-05-08 ASSESSMENT — PAIN SCALES - GENERAL
PAINLEVEL_OUTOF10: 6
PAINLEVEL_OUTOF10: 7
PAINLEVEL_OUTOF10: 9
PAINLEVEL_OUTOF10: 5
PAINLEVEL_OUTOF10: 8
PAINLEVEL_OUTOF10: 4
PAINLEVEL_OUTOF10: 5
PAINLEVEL_OUTOF10: 5
PAINLEVEL_OUTOF10: 7

## 2024-05-08 ASSESSMENT — LIFESTYLE VARIABLES
HOW OFTEN DO YOU HAVE A DRINK CONTAINING ALCOHOL: NEVER
HOW MANY STANDARD DRINKS CONTAINING ALCOHOL DO YOU HAVE ON A TYPICAL DAY: PATIENT DOES NOT DRINK

## 2024-05-08 NOTE — PLAN OF CARE
Problem: Discharge Planning  Goal: Discharge to home or other facility with appropriate resources  5/8/2024 0051 by Josselin Johnson RN  Outcome: Progressing     Problem: ABCDS Injury Assessment  Goal: Absence of physical injury  5/8/2024 0051 by Josselin Johnson RN  Outcome: Progressing     Problem: Safety - Adult  Goal: Free from fall injury  5/8/2024 0051 by Josselin Johnson RN  Outcome: Progressing     Problem: Pain  Goal: Verbalizes/displays adequate comfort level or baseline comfort level  5/8/2024 0051 by Josselin Johnson RN  Outcome: Progressing     Problem: Chronic Conditions and Co-morbidities  Goal: Patient's chronic conditions and co-morbidity symptoms are monitored and maintained or improved  5/8/2024 0051 by Josselin Johnson RN  Outcome: Progressing

## 2024-05-08 NOTE — CARE COORDINATION
5/8/2024: SS NOTE:  Notified by Dianne, admissions for [facility] that they have accepted pt for [facility] admission and rehab bed will be available on Friday 5/10 to admit pt under her Medicare, following a 3 day qualifying Inpt hospital stay and will follow with family after her rehab for anticipated transfer to their AL if pt unable to return to her home, pt and pt's daughter, Marah notified, family will transport pt to facility by private car, [facility] [employer] [facility] initiated, sw/cm will follow for d/c order to confirm transfer and compete exemption. Nursing informed.Electronically signed by TEMO Youngblood on 5/8/2024 at 10:15 AM

## 2024-05-08 NOTE — PROGRESS NOTES
Physical Therapy Treatment Note/Plan of Care    Room #:  0322/0322-02  Patient Name: Blessing Campbell  YOB: 1933  MRN: [id number]    Date of Service: 5/8/2024     Tentative placement recommendation: Subacute Rehab  Equipment recommendation: To be determined      Evaluating Physical Therapist: Veena Lees, PT #[id number]      Specific Provider Orders/Date/Referring Provider :  05/07/24 0400    PT eval and treat  Start:  05/07/24 0400,   End:  05/07/24 0400,   ONE TIME,   Standing Count:  1 Occurrences,   R       Jeanette Granados MD    Admitting Diagnosis:   Intractable back pain [M54.9]  Unable to ambulate [R26.2]      unable to ambulate  for last few days prior to arrival to the hospital.  Her long standing back pain is now getting more persistent.  Surgery: none  Visit Diagnoses         Codes    Closed fracture of sacrum, unspecified portion of sacrum, initial encounter ([facility])    -  Primary S32.10XA    Closed fracture of transverse process of lumbar vertebra, initial encounter ([facility])     S32.009A            Patient Active Problem List   Diagnosis    Atrial fibrillation ([facility])    Hypertension    Primary biliary cirrhosis ([facility])    Syncope due to orthostatic hypotension    Intractable back pain    Hx of blood clots    Arthritis    Unable to ambulate        ASSESSMENT of Current Deficits Patient exhibits decreased strength, balance, and endurance impairing functional mobility, transfers, gait , gait distance, and tolerance to activity Patient improved with assist level for function and ambulation distance this session. Patient benefits from use of wheeled walker for inc stability and safety. Patient tolerates seated exercises and education on spinal precautions and log rolling. Patient pleasant and motivated. Patient requires continued skilled physical therapy to address concerns listed above for increased safety and function at discharge.         PHYSICAL THERAPY  PLAN OF CARE       Physical therapy plan of  care is established based on physician order,  patient diagnosis and clinical assessment    Current Treatment Recommendations:    -Bed Mobility: Lower and upper extremity exercises, and trunk control activities and log roll  -Sitting Balance: Incorporate reaching activities to activate trunk muscles   -Standing Balance: Perform strengthening exercises in standing to promote motor control with or without upper extremity support  and Instruct patient on adequate base of support to maintain balance  -Transfers: Provide instruction on proper hand and foot position for adequate transfer of weight onto lower extremities and use of gait device if needed, Cues for hand placement, technique and safety. Provide stabilization to prevent fall , Facilitate weight shift forward on to lower extremities and provide necessary stabilization of bilateral lower extremities , and Assist with extension of knees trunk and hip to accept weight transfer   -Gait: Gait training, Standing activities to improve: base of support, weight shift, weight bearing , and Pregait training to emphasize: Sequencing , Device control, Upright, and Safety   -Endurance: Utilize Supervised activities to increase level of endurance to allow for safe functional mobility including transfers and gait     PT long term treatment goals are located in below grid    Patient and or family understand(s) diagnosis, prognosis, and plan of care.    Frequency of treatments: Patient will be seen  daily.         Prior Level of Function: Patient ambulated independently    Rehab Potential: good   for baseline    Past medical history:   Past Medical History:   Diagnosis Date    Arthritis     Atrial fibrillation (HCC)     Cancer (HCC)     skin    Hx of blood clots     thromophlebitis yrs ago    Hypertension     Primary biliary cirrhosis (HCC)      Past Surgical History:   Procedure Laterality Date    BACK SURGERY      compression fx    BREAST SURGERY      lumpectomy right benign     CARDIAC SURGERY      heart catheterization    CATARACT REMOVAL WITH IMPLANT  10/08/2012    left    CATARACT REMOVAL WITH IMPLANT  01/07/2013    right     COLONOSCOPY      EYE SURGERY      HYSTERECTOMY (CERVIX STATUS UNKNOWN)      MOHS SURGERY N/A     OTHER SURGICAL HISTORY  03/19/2015    I and D of right vulva abcess    OTHER SURGICAL HISTORY  03/19/2015    i&D of vulva    SKIN BIOPSY      VARICOSE VEIN SURGERY      bilateral        SUBJECTIVE:    Precautions: Bedrest with bathroom Privileges , falls, alarm, and per Dr. Heath Vera's progress note written on 05/06/2024, patient is to be WBAT (weight bearing as tolerated)         Imaging results: CT LUMBAR SPINE WO CONTRAST    Result Date: 5/6/2024  EXAMINATION: CT OF THE LUMBAR SPINE WITHOUT CONTRAST  5/6/2024      1. Acute fracture in the right paracentral aspect of the sacrum. 2. Acute nondisplaced fracture in the left L5 transverse process. 3. Chronic compression fractures with kyphoplasty changes in the T10, T11 and L3 vertebral bodies. 4. Moderate dextroscoliotic curvature of the thoracolumbar spine. 5. Left nephrolithiasis.  No hydronephrosis.     CT PELVIS WO CONTRAST Additional Contrast? None    Result Date: 5/6/2024  EXAMINATION: CT OF THE PELVIS WITHOUT CONTRAST 5/6/2024 7:35 pm      1. Acute nondisplaced fracture with an oblique to craniocaudal orientation involving S1-S2 on the right side. 2. Fracture of the transverse process of L5 on the left.     XR HIP 2-3 VW W PELVIS RIGHT    Result Date: 4/30/2024  EXAMINATION: ONE XRAY VIEW OF THE PELVIS AND TWO XRAY VIEWS RIGHT HIP 4/30/2024 2:40 pm    No acute fractures of the pelvic bones. No acute fractures of the right and left hip joints.     XR LUMBAR SPINE (2-3 VIEWS)    Result Date: 4/30/2024  EXAMINATION: 3 XRAY VIEWS OF THE LUMBAR SPINE 4/30/2024 2:40 pm    No acute osseous findings. RECOMMENDATION: Recommend short-term follow-up radiographs in 7-10 days or cross-sectional imaging (CT/MRI) if there

## 2024-05-08 NOTE — PROGRESS NOTES
OCCUPATIONAL THERAPY TREATMENT NOTE    [facility]   [address] [facility]        Date:2024  Patient Name: Blessing Campbell  MRN: [id number]  : 1933  Room: [address] [place] [address]     Evaluating OT: Cass Gaming OTR/L; [id number]      Referring Provider and Specific Provider Orders/Date:      24   OT eval and treat  Start:  24,   End:  24,   ONE TIME,   Standing Count:  1 Occurrences,   R         Jeanette Granados MD       Placement Recommendation: Subacute         Diagnosis:   1. Closed fracture of sacrum, unspecified portion of sacrum, initial encounter ([facility])    2. Closed fracture of transverse process of lumbar vertebra, initial encounter ([facility])         Surgery: none        Pertinent Medical History:       Past Medical History        Past Medical History:   Diagnosis Date    Arthritis      Atrial fibrillation (HCC)      Cancer (HCC)       skin    Hx of blood clots       thromophlebitis yrs ago    Hypertension      Primary biliary cirrhosis (HCC)              Past Surgical History         Past Surgical History:   Procedure Laterality Date    BACK SURGERY         compression fx    BREAST SURGERY         lumpectomy right benign    CARDIAC SURGERY         heart catheterization    CATARACT REMOVAL WITH IMPLANT   10/08/2012     left    CATARACT REMOVAL WITH IMPLANT   2013     right     COLONOSCOPY        EYE SURGERY        HYSTERECTOMY (CERVIX STATUS UNKNOWN)        MOHS SURGERY N/A      OTHER SURGICAL HISTORY   2015     I and D of right vulva abcess    OTHER SURGICAL HISTORY   2015     i&D of vulva    SKIN BIOPSY        VARICOSE VEIN SURGERY         bilateral           Precautions:  Fall Risk, Acute fracture in the right paracentral aspect of the sacrum, Acute nondisplaced fracture in the left L5 transverse process.       Assessment of current deficits:     [x] Functional mobility            [x]ADLs           [x]  Strength                   []Cognition    [x] Functional transfers          [x] IADLs          [] Safety Awareness   [x]Endurance    [] Fine Coordination              [x] Balance      [] Vision/perception    []Sensation      []Gross Motor Coordination  [] ROM           [] Delirium                   [] Motor Control      OT PLAN OF CARE   OT POC based on physician orders, patient diagnosis and results of clinical assessment     Frequency/Duration 1-3 days/wk for 2 weeks PRN      Specific OT Treatment Interventions to include:   * Instruction/training on adapted ADL techniques and AE recommendations to increase functional independence within precautions       * Training on energy conservation strategies, correct breathing pattern and techniques to improve independence/tolerance for self-care routine  * Functional transfer/mobility training/DME recommendations for increased independence, safety, and fall prevention  * Patient/Family education to increase follow through with safety techniques and functional independence  * Recommendation of environmental modifications for increased safety with functional transfers/mobility and ADLs  * Therapeutic exercise to improve motor endurance, ROM, and functional strength for ADLs/functional transfers  * Therapeutic activities to facilitate/challenge dynamic balance, stand tolerance for increased safety and independence with ADLs  * Positioning to improve skin integrity, interaction with environment and functional independence     Recommended Adaptive Equipment: TBD at rehab      Home Living: alone (the past few weeks her family has been staying with her around the clock); single family home, 1 story, 3 steps to enter front door with B rails, 2+1 with a rail, tub shower.        Equipment owned: wheeled walker, quad cane, wheelchair, shower chair, grab bars (2), rollator      Prior Level of Function: pt has required assistance the past few weeks with ADLs and IADLs; ambulated with

## 2024-05-08 NOTE — PROGRESS NOTES
Admitting Date and Time: 5/6/2024  5:15 PM  Admit Dx: Intractable back pain [M54.9]  Unable to ambulate [R26.2]    Subjective:  back pain currently controlled. still unable to ambulate  Per RN: no new issues    ROS: denies fever, chills, cp, sob, n/v, HA unless stated above.     predniSONE  5 mg Oral Daily    metoprolol succinate  75 mg Oral Daily    sodium chloride flush  10 mL IntraVENous 2 times per day    amLODIPine  5 mg Oral BID    losartan  100 mg Oral Daily    apixaban  2.5 mg Oral BID    sertraline  50 mg Oral Daily     oxyCODONE-acetaminophen, 1 tablet, Q4H PRN  sodium chloride flush, 10 mL, PRN  sodium chloride, , PRN  promethazine, 12.5 mg, Q6H PRN   Or  ondansetron, 4 mg, Q6H PRN  polyethylene glycol, 17 g, Daily PRN  acetaminophen, 650 mg, Q6H PRN   Or  acetaminophen, 650 mg, Q6H PRN  morphine, 2 mg, Q3H PRN         Objective:    BP (!) 144/90   Pulse 82   Temp 97.9 °F (36.6 °C) (Oral)   Resp 16   Ht 1.549 m (5' 1\")   Wt 52.2 kg (115 lb)   SpO2 97%   BMI 21.73 kg/m²   General Appearance: alert and oriented to person, place and time and in no acute distress  Skin: warm and dry  Head: normocephalic and atraumatic  Eyes: pupils equal, round, and reactive to light, extraocular eye movements intact, conjunctivae normal  Neck: neck supple and non tender without mass   Pulmonary/Chest: clear to auscultation bilaterally- no wheezes, rales or rhonchi, normal air movement, no respiratory distress  Cardiovascular: normal rate, normal S1 and S2 and no carotid bruits  Abdomen: soft, non-tender, non-distended, normal bowel sounds, no masses or organomegaly  Extremities: no cyanosis, no clubbing and no  edema  Neurologic: no cranial nerve deficit and speech normal      Recent Labs     05/06/24  1906 05/08/24  0439    139   K 3.3* 3.3*    104   CO2 26 29   BUN 24* 19   CREATININE 0.5 0.7   GLUCOSE 86 83   CALCIUM 8.7 8.6         No results for input(s): \"ALKPHOS\", \"PROT\", \"LABALBU\", \"BILITOT\",  \"AST\", \"ALT\" in the last 72 hours.    Recent Labs     05/06/24  1906 05/07/24  0446   WBC 11.2 8.9   RBC 4.10 3.89   HGB 12.0 11.2*   HCT 37.3 35.8   MCV 91.0 92.0   MCH 29.3 28.8   MCHC 32.2 31.3*   RDW 15.2* 15.2*    292   MPV 8.6 9.0             Radiology:   CT LUMBAR SPINE WO CONTRAST   Final Result   1. Acute fracture in the right paracentral aspect of the sacrum.   2. Acute nondisplaced fracture in the left L5 transverse process.   3. Chronic compression fractures with kyphoplasty changes in the T10, T11 and   L3 vertebral bodies.   4. Moderate dextroscoliotic curvature of the thoracolumbar spine.   5. Left nephrolithiasis.  No hydronephrosis.         CT PELVIS WO CONTRAST Additional Contrast? None   Final Result   1. Acute nondisplaced fracture with an oblique to craniocaudal orientation   involving S1-S2 on the right side.   2. Fracture of the transverse process of L5 on the left.             Assessment:  Principal Problem:    Intractable back pain  Active Problems:    Primary biliary cirrhosis (HCC)    Hx of blood clots    Arthritis    Unable to ambulate  Resolved Problems:    * No resolved hospital problems. *      Plan: History of present illness from History and Physical:  This is a 89 y.o. female  has a past medical history of Arthritis, Atrial fibrillation (HCC), Cancer (HCC), Hx of blood clots, Hypertension, and Primary biliary cirrhosis (HCC). presented with Intractable back pain, unable to ambulate  for last few days prior to arrival to the hospital.  Her long standing back pain is now getting more persistent. Exacerbated by general activity or exertion. Now unable to ambulate. On direct questioning, patient denied any  resting ongoing chest pain, resting SOB, hemoptysis, productive cough, fever, ongoing palpitation, active abdominal pain, hematemesis, rectal bleeding, radha, hematuria, any other  and GI complaints, and any new focal neuro deficits.      Acute on chronic back pain due to

## 2024-05-09 LAB
ANION GAP SERPL CALCULATED.3IONS-SCNC: 10 MMOL/L (ref 7–16)
BUN SERPL-MCNC: 18 MG/DL (ref 6–23)
CALCIUM SERPL-MCNC: 8.4 MG/DL (ref 8.6–10.2)
CHLORIDE SERPL-SCNC: 104 MMOL/L (ref 98–107)
CO2 SERPL-SCNC: 26 MMOL/L (ref 22–29)
CREAT SERPL-MCNC: 0.6 MG/DL (ref 0.5–1)
GFR, ESTIMATED: 85 ML/MIN/1.73M2
GLUCOSE SERPL-MCNC: 75 MG/DL (ref 74–99)
MAGNESIUM SERPL-MCNC: 2 MG/DL (ref 1.6–2.6)
PHOSPHATE SERPL-MCNC: 3.4 MG/DL (ref 2.5–4.5)
POTASSIUM SERPL-SCNC: 3.2 MMOL/L (ref 3.5–5)
SODIUM SERPL-SCNC: 140 MMOL/L (ref 132–146)

## 2024-05-09 PROCEDURE — 6360000002 HC RX W HCPCS: Performed by: INTERNAL MEDICINE

## 2024-05-09 PROCEDURE — 2580000003 HC RX 258: Performed by: INTERNAL MEDICINE

## 2024-05-09 PROCEDURE — 6370000000 HC RX 637 (ALT 250 FOR IP): Performed by: INTERNAL MEDICINE

## 2024-05-09 PROCEDURE — 99232 SBSQ HOSP IP/OBS MODERATE 35: CPT | Performed by: INTERNAL MEDICINE

## 2024-05-09 PROCEDURE — 97535 SELF CARE MNGMENT TRAINING: CPT

## 2024-05-09 PROCEDURE — 1200000000 HC SEMI PRIVATE

## 2024-05-09 PROCEDURE — 80048 BASIC METABOLIC PNL TOTAL CA: CPT

## 2024-05-09 PROCEDURE — 84100 ASSAY OF PHOSPHORUS: CPT

## 2024-05-09 PROCEDURE — 36415 COLL VENOUS BLD VENIPUNCTURE: CPT

## 2024-05-09 PROCEDURE — 83735 ASSAY OF MAGNESIUM: CPT

## 2024-05-09 PROCEDURE — 97530 THERAPEUTIC ACTIVITIES: CPT

## 2024-05-09 RX ORDER — DOCUSATE SODIUM 100 MG/1
100 CAPSULE, LIQUID FILLED ORAL 2 TIMES DAILY
Status: DISCONTINUED | OUTPATIENT
Start: 2024-05-09 | End: 2024-05-10 | Stop reason: HOSPADM

## 2024-05-09 RX ADMIN — ONDANSETRON 4 MG: 2 INJECTION INTRAMUSCULAR; INTRAVENOUS at 01:10

## 2024-05-09 RX ADMIN — AMLODIPINE BESYLATE 5 MG: 5 TABLET ORAL at 17:00

## 2024-05-09 RX ADMIN — LOSARTAN POTASSIUM 100 MG: 100 TABLET, FILM COATED ORAL at 08:47

## 2024-05-09 RX ADMIN — APIXABAN 2.5 MG: 2.5 TABLET, FILM COATED ORAL at 20:36

## 2024-05-09 RX ADMIN — AMLODIPINE BESYLATE 5 MG: 5 TABLET ORAL at 08:47

## 2024-05-09 RX ADMIN — Medication 10 ML: at 07:00

## 2024-05-09 RX ADMIN — APIXABAN 2.5 MG: 2.5 TABLET, FILM COATED ORAL at 08:47

## 2024-05-09 RX ADMIN — SERTRALINE 50 MG: 50 TABLET, FILM COATED ORAL at 08:47

## 2024-05-09 RX ADMIN — OXYCODONE AND ACETAMINOPHEN 1 TABLET: 5; 325 TABLET ORAL at 17:00

## 2024-05-09 RX ADMIN — METOPROLOL SUCCINATE 75 MG: 50 TABLET, EXTENDED RELEASE ORAL at 08:47

## 2024-05-09 RX ADMIN — MORPHINE SULFATE 2 MG: 2 INJECTION, SOLUTION INTRAMUSCULAR; INTRAVENOUS at 01:10

## 2024-05-09 RX ADMIN — POTASSIUM BICARBONATE 50 MEQ: 978 TABLET, EFFERVESCENT ORAL at 08:47

## 2024-05-09 RX ADMIN — Medication 10 ML: at 20:39

## 2024-05-09 RX ADMIN — DOCUSATE SODIUM 100 MG: 100 CAPSULE, LIQUID FILLED ORAL at 20:36

## 2024-05-09 RX ADMIN — DOCUSATE SODIUM 100 MG: 100 CAPSULE, LIQUID FILLED ORAL at 08:47

## 2024-05-09 RX ADMIN — OXYCODONE AND ACETAMINOPHEN 1 TABLET: 5; 325 TABLET ORAL at 11:26

## 2024-05-09 RX ADMIN — PREDNISONE 5 MG: 5 TABLET ORAL at 08:47

## 2024-05-09 ASSESSMENT — PAIN SCALES - GENERAL
PAINLEVEL_OUTOF10: 0
PAINLEVEL_OUTOF10: 8
PAINLEVEL_OUTOF10: 8

## 2024-05-09 NOTE — PROGRESS NOTES
Physical Therapy Treatment Note/Plan of Care    Room #:  0322/0322-02  Patient Name: Blessing Campbell  YOB: 1933  MRN: [id number]    Date of Service: 5/9/2024     Tentative placement recommendation: Subacute Rehab  Equipment recommendation: To be determined      Evaluating Physical Therapist: Veena Lees, PT #[id number]      Specific Provider Orders/Date/Referring Provider :  05/07/24 0400    PT eval and treat  Start:  05/07/24 0400,   End:  05/07/24 0400,   ONE TIME,   Standing Count:  1 Occurrences,   R       Jeanette Granados MD    Admitting Diagnosis:   Intractable back pain [M54.9]  Unable to ambulate [R26.2]      unable to ambulate  for last few days prior to arrival to the hospital.  Her long standing back pain is now getting more persistent.  Surgery: none  Visit Diagnoses         Codes    Closed fracture of sacrum, unspecified portion of sacrum, initial encounter ([facility])    -  Primary S32.10XA    Closed fracture of transverse process of lumbar vertebra, initial encounter ([facility])     S32.009A            Patient Active Problem List   Diagnosis    Atrial fibrillation (HCC)    Hypertension    Primary biliary cirrhosis (HCC)    Syncope due to orthostatic hypotension    Intractable back pain    Hx of blood clots    Arthritis    Unable to ambulate        ASSESSMENT of Current Deficits Patient exhibits decreased strength, balance, and endurance impairing functional mobility, transfers, gait , gait distance, and tolerance to activity Patient with increased pain this session, limiting further ambulation distance and function. Patient needing standing rest breaks during ambulation, stretching back into extension to relieve pain. Patient requires continued skilled physical therapy to address concerns listed above for increased safety and function at discharge.         PHYSICAL THERAPY  PLAN OF CARE       Physical therapy plan of care is established based on physician order,  patient diagnosis and clinical  10/08/2012    left    CATARACT REMOVAL WITH IMPLANT  01/07/2013    right     COLONOSCOPY      EYE SURGERY      HYSTERECTOMY (CERVIX STATUS UNKNOWN)      MOHS SURGERY N/A     OTHER SURGICAL HISTORY  03/19/2015    I and D of right vulva abcess    OTHER SURGICAL HISTORY  03/19/2015    i&D of vulva    SKIN BIOPSY      VARICOSE VEIN SURGERY      bilateral        SUBJECTIVE:    Precautions: Bedrest with bathroom Privileges , falls, alarm, and per Dr. Heath Vera's progress note written on 05/06/2024, patient is to be WBAT (weight bearing as tolerated)     spinal precautions     Imaging results: CT LUMBAR SPINE WO CONTRAST    Result Date: 5/6/2024  EXAMINATION: CT OF THE LUMBAR SPINE WITHOUT CONTRAST  5/6/2024      1. Acute fracture in the right paracentral aspect of the sacrum. 2. Acute nondisplaced fracture in the left L5 transverse process. 3. Chronic compression fractures with kyphoplasty changes in the T10, T11 and L3 vertebral bodies. 4. Moderate dextroscoliotic curvature of the thoracolumbar spine. 5. Left nephrolithiasis.  No hydronephrosis.     CT PELVIS WO CONTRAST Additional Contrast? None    Result Date: 5/6/2024  EXAMINATION: CT OF THE PELVIS WITHOUT CONTRAST 5/6/2024 7:35 pm      1. Acute nondisplaced fracture with an oblique to craniocaudal orientation involving S1-S2 on the right side. 2. Fracture of the transverse process of L5 on the left.     XR HIP 2-3 VW W PELVIS RIGHT    Result Date: 4/30/2024  EXAMINATION: ONE XRAY VIEW OF THE PELVIS AND TWO XRAY VIEWS RIGHT HIP 4/30/2024 2:40 pm    No acute fractures of the pelvic bones. No acute fractures of the right and left hip joints.     XR LUMBAR SPINE (2-3 VIEWS)    Result Date: 4/30/2024  EXAMINATION: 3 XRAY VIEWS OF THE LUMBAR SPINE 4/30/2024 2:40 pm    No acute osseous findings. RECOMMENDATION: Recommend short-term follow-up radiographs in 7-10 days or cross-sectional imaging (CT/MRI) if there is persistent concern for fracture or the symptoms persist.

## 2024-05-09 NOTE — PROGRESS NOTES
Admitting Date and Time: 5/6/2024  5:15 PM  Admit Dx: Intractable back pain [M54.9]  Unable to ambulate [R26.2]    Subjective:  back pain currently controlled. still unable to ambulate not understanding discharge plan despite running through explanation 3 times.  She just work up.  Prior to that rn says she was medicated heavily last night with prns  Per RN: no new issues    ROS: denies fever, chills, cp, sob, n/v, HA unless stated above.     docusate sodium  100 mg Oral BID    predniSONE  5 mg Oral Daily    metoprolol succinate  75 mg Oral Daily    sodium chloride flush  10 mL IntraVENous 2 times per day    amLODIPine  5 mg Oral BID    losartan  100 mg Oral Daily    apixaban  2.5 mg Oral BID    sertraline  50 mg Oral Daily     oxyCODONE-acetaminophen, 1 tablet, Q4H PRN  sodium chloride flush, 10 mL, PRN  sodium chloride, , PRN  promethazine, 12.5 mg, Q6H PRN   Or  ondansetron, 4 mg, Q6H PRN  polyethylene glycol, 17 g, Daily PRN  acetaminophen, 650 mg, Q6H PRN   Or  acetaminophen, 650 mg, Q6H PRN  morphine, 2 mg, Q3H PRN         Objective:    /87   Pulse 86   Temp 98 °F (36.7 °C) (Oral)   Resp 16   Ht 1.549 m (5' 1\")   Wt 52.2 kg (115 lb)   SpO2 94%   BMI 21.73 kg/m²   General Appearance: alert and oriented to person, place and time and in no acute distress  Skin: warm and dry  Head: normocephalic and atraumatic  Eyes: pupils equal, round, and reactive to light, extraocular eye movements intact, conjunctivae normal  Neck: neck supple and non tender without mass   Pulmonary/Chest: clear to auscultation bilaterally- no wheezes, rales or rhonchi, normal air movement, no respiratory distress  Cardiovascular: normal rate, normal S1 and S2 and no carotid bruits  Abdomen: soft, non-tender, non-distended, normal bowel sounds, no masses or organomegaly  Extremities: no cyanosis, no clubbing and no  edema  Neurologic: no cranial nerve deficit and speech normal      Recent Labs     05/06/24  1906 05/08/24  0445  SOB, hemoptysis, productive cough, fever, ongoing palpitation, active abdominal pain, hematemesis, rectal bleeding, radha, hematuria, any other  and GI complaints, and any new focal neuro deficits.      Acute on chronic back pain due to fractures of sacrum and transverse process of L5.  PT OT social work for placement analgesia via iv morphine.  Apparently tramadol is not preferred by her per the nurse.   She seems controlled with Percocet with 5 doses given on 5/8.  On 5/9 start Colace 100 twice daily straight for a bowel regimen preventing constipation as a side effect of the narcotic  A-fib rate control medications and systemic anticoagulation with Eliquis  Hypertension monitor on home meds  Chronic arthritis on steroids of prednisone 5 daily complicated with osteoporosis on vitamin D and calcium. She says that she has been on this for years ever since seen by rheumatology. She takes it for polymyalgia rheumatica  Hypokalemia: 5/9 supplement with 50 mill equivalents potassium and check Phos and mag mood disorder continue Zoloft only at 50  DVT prophylaxis: Eliquis as above for systemic anticoagulation for A-fib  Full code.  Disposition: Placement per  will occur on Friday     NOTE: This report was transcribed using voice recognition software. Every effort was made to ensure accuracy; however, inadvertent computerized transcription errors may be present.     Electronically signed by BANDAR CHRISTIANSEN MD on 5/9/2024 at 8:25 AM

## 2024-05-09 NOTE — CARE COORDINATION
5/9/2024: SS NOTE:  Met with pt's daughter, Marah in her mother's room, dtr plans to transport her mother to [facility] tomorrow morning by private car around 9:30 am, [facility] admissions liaison notified, [facility] initiated, sw/cm will follow in am for d/c order to complete exemption. Nursing informed.Electronically signed by TEMO Youngblood on 5/8/2024 at 10:15 AM

## 2024-05-09 NOTE — PLAN OF CARE
Problem: Discharge Planning  Goal: Discharge to home or other facility with appropriate resources  5/8/2024 2252 by Jovon Blair RN  Outcome: Progressing  5/8/2024 1031 by Nani Mccray RN  Outcome: Progressing     Problem: ABCDS Injury Assessment  Goal: Absence of physical injury  5/8/2024 2252 by Jovon Blair RN  Outcome: Progressing  5/8/2024 1031 by Nani Mccray RN  Outcome: Progressing     Problem: Safety - Adult  Goal: Free from fall injury  5/8/2024 2252 by Jovon Blair RN  Outcome: Progressing  5/8/2024 1031 by Nani Mccray RN  Outcome: Progressing     Problem: Pain  Goal: Verbalizes/displays adequate comfort level or baseline comfort level  Outcome: Progressing     Problem: Chronic Conditions and Co-morbidities  Goal: Patient's chronic conditions and co-morbidity symptoms are monitored and maintained or improved  Outcome: Progressing

## 2024-05-09 NOTE — PLAN OF CARE
Problem: Discharge Planning  Goal: Discharge to home or other facility with appropriate resources  5/9/2024 0813 by Duy Matos RN  Outcome: Progressing     Problem: ABCDS Injury Assessment  Goal: Absence of physical injury  5/9/2024 0813 by Duy Matos RN  Outcome: Progressing     Problem: Safety - Adult  Goal: Free from fall injury  5/9/2024 0813 by Duy Matos RN  Outcome: Progressing     Problem: Pain  Goal: Verbalizes/displays adequate comfort level or baseline comfort level  5/9/2024 0813 by Duy Matos RN  Outcome: Progressing     Problem: Chronic Conditions and Co-morbidities  Goal: Patient's chronic conditions and co-morbidity symptoms are monitored and maintained or improved  5/9/2024 0813 by Duy Matos RN  Outcome: Progressing     Problem: Hematologic - Adult  Goal: Maintains hematologic stability  Outcome: Progressing

## 2024-05-09 NOTE — PROGRESS NOTES
OCCUPATIONAL THERAPY TREATMENT NOTE    [facility]   [address] [facility]        Date:2024  Patient Name: Blessing Campbell  MRN: [id number]  : 1933  Room: [address] [place] [address]     Evaluating OT: Cass Gaming OTR/L; [id number]      Referring Provider and Specific Provider Orders/Date:      24   OT eval and treat  Start:  24,   End:  24,   ONE TIME,   Standing Count:  1 Occurrences,   R         Jeanette Granados MD       Placement Recommendation: Subacute         Diagnosis:   1. Closed fracture of sacrum, unspecified portion of sacrum, initial encounter ([facility])    2. Closed fracture of transverse process of lumbar vertebra, initial encounter ([facility])         Surgery: none        Pertinent Medical History:       Past Medical History        Past Medical History:   Diagnosis Date    Arthritis      Atrial fibrillation (HCC)      Cancer (HCC)       skin    Hx of blood clots       thromophlebitis yrs ago    Hypertension      Primary biliary cirrhosis (HCC)              Past Surgical History         Past Surgical History:   Procedure Laterality Date    BACK SURGERY         compression fx    BREAST SURGERY         lumpectomy right benign    CARDIAC SURGERY         heart catheterization    CATARACT REMOVAL WITH IMPLANT   10/08/2012     left    CATARACT REMOVAL WITH IMPLANT   2013     right     COLONOSCOPY        EYE SURGERY        HYSTERECTOMY (CERVIX STATUS UNKNOWN)        MOHS SURGERY N/A      OTHER SURGICAL HISTORY   2015     I and D of right vulva abcess    OTHER SURGICAL HISTORY   2015     i&D of vulva    SKIN BIOPSY        VARICOSE VEIN SURGERY         bilateral           Precautions:  Fall Risk, Acute fracture in the right paracentral aspect of the sacrum, Acute nondisplaced fracture in the left L5 transverse process.       Assessment of current deficits:     [x] Functional mobility            [x]ADLs           [x]  a rollator PRN     Pain Level: 5/10 pain in \"lower back and upper butt;\" Nursing notified.            Cognition: A&O: 3/4; Follows 1 step directions              Memory: fair               Sequencing: fair               Problem solving: fair               Judgement/safety: fair      [facility]   AM-PAC Daily Activity - Inpatient   How much help is needed for putting on and taking off regular lower body clothing?: Total  How much help is needed for bathing (which includes washing, rinsing, drying)?: A Lot  How much help is needed for toileting (which includes using toilet, bedpan, or urinal)?: A Little  How much help is needed for putting on and taking off regular upper body clothing?: A Lot  How much help is needed for taking care of personal grooming?: A Little  How much help for eating meals?: None  [facility]: 15  AM-PAC Inpatient ADL T-Scale Score : 34.69  ADL Inpatient CMS 0-100% Score: 56.46  ADL Inpatient CMS G-Code Modifier : CK                Functional Assessment:     Initial Eval Status  Date: 5/7/24 Treatment Status  Date: 5/9/24 STGs = LTGs  Time frame: 10-14 days   Feeding Independent   Independent  Independent    Grooming Supervision  MIN A to brush hair seated in chair  Independent    UB Dressing Moderate Assist for gown management N/t MOD A per last report  Independent    LB Dressing Dependent to thread feet through incontinent garment while seated in bedside chair, pt then stood and held walker while garment was brought up around hips and waist.   Maximal Assist for lower body clothing management (garment) before and after using bedside commode.  MAX A to raymon/doff sock using LH shoe horn, and sock aid pt requiring assist to use LH shoe horn to doff sock, to thread sock over device mod v/c's for following instructions    Supervision    Bathing Moderate Assist N/t MOD A per last report  Supervision    Toileting Dependent  MIN A  Supervision    Bed Mobility  Supine to sit:

## 2024-05-10 VITALS
TEMPERATURE: 97.8 F | DIASTOLIC BLOOD PRESSURE: 82 MMHG | RESPIRATION RATE: 16 BRPM | HEART RATE: 78 BPM | SYSTOLIC BLOOD PRESSURE: 122 MMHG | OXYGEN SATURATION: 95 % | WEIGHT: 115 LBS | BODY MASS INDEX: 21.71 KG/M2 | HEIGHT: 61 IN

## 2024-05-10 DIAGNOSIS — M54.9 BACK PAIN, UNSPECIFIED BACK LOCATION, UNSPECIFIED BACK PAIN LATERALITY, UNSPECIFIED CHRONICITY: Primary | ICD-10-CM

## 2024-05-10 LAB
ANION GAP SERPL CALCULATED.3IONS-SCNC: 10 MMOL/L (ref 7–16)
BUN SERPL-MCNC: 25 MG/DL (ref 6–23)
CALCIUM SERPL-MCNC: 8.5 MG/DL (ref 8.6–10.2)
CHLORIDE SERPL-SCNC: 105 MMOL/L (ref 98–107)
CO2 SERPL-SCNC: 27 MMOL/L (ref 22–29)
CREAT SERPL-MCNC: 0.9 MG/DL (ref 0.5–1)
GFR, ESTIMATED: 65 ML/MIN/1.73M2
GLUCOSE SERPL-MCNC: 74 MG/DL (ref 74–99)
POTASSIUM SERPL-SCNC: 3.9 MMOL/L (ref 3.5–5)
SODIUM SERPL-SCNC: 142 MMOL/L (ref 132–146)

## 2024-05-10 PROCEDURE — 2580000003 HC RX 258: Performed by: INTERNAL MEDICINE

## 2024-05-10 PROCEDURE — 99239 HOSP IP/OBS DSCHRG MGMT >30: CPT | Performed by: INTERNAL MEDICINE

## 2024-05-10 PROCEDURE — 6370000000 HC RX 637 (ALT 250 FOR IP): Performed by: INTERNAL MEDICINE

## 2024-05-10 PROCEDURE — 36415 COLL VENOUS BLD VENIPUNCTURE: CPT

## 2024-05-10 PROCEDURE — 80048 BASIC METABOLIC PNL TOTAL CA: CPT

## 2024-05-10 RX ORDER — PREDNISONE 5 MG/1
5 TABLET ORAL DAILY
Qty: 10 TABLET | Refills: 0 | DISCHARGE
Start: 2024-05-10 | End: 2024-05-20

## 2024-05-10 RX ORDER — PSEUDOEPHEDRINE HCL 30 MG
100 TABLET ORAL 2 TIMES DAILY
DISCHARGE
Start: 2024-05-10

## 2024-05-10 RX ORDER — OXYCODONE HYDROCHLORIDE AND ACETAMINOPHEN 5; 325 MG/1; MG/1
1 TABLET ORAL EVERY 4 HOURS PRN
Refills: 0 | Status: SHIPPED | DISCHARGE
Start: 2024-05-10 | End: 2024-05-13

## 2024-05-10 RX ORDER — METOPROLOL SUCCINATE 25 MG/1
75 TABLET, EXTENDED RELEASE ORAL DAILY
Qty: 30 TABLET | Refills: 0 | Status: SHIPPED | OUTPATIENT
Start: 2024-05-10

## 2024-05-10 RX ORDER — OXYCODONE AND ACETAMINOPHEN 5; 325 MG/1; MG/1
1 TABLET ORAL EVERY 4 HOURS PRN
Qty: 120 TABLET | Refills: 0 | Status: SHIPPED | OUTPATIENT
Start: 2024-05-10

## 2024-05-10 RX ADMIN — DOCUSATE SODIUM 100 MG: 100 CAPSULE, LIQUID FILLED ORAL at 10:06

## 2024-05-10 RX ADMIN — APIXABAN 2.5 MG: 2.5 TABLET, FILM COATED ORAL at 10:06

## 2024-05-10 RX ADMIN — OXYCODONE AND ACETAMINOPHEN 1 TABLET: 5; 325 TABLET ORAL at 10:06

## 2024-05-10 RX ADMIN — AMLODIPINE BESYLATE 5 MG: 5 TABLET ORAL at 10:08

## 2024-05-10 RX ADMIN — Medication 10 ML: at 10:08

## 2024-05-10 RX ADMIN — LOSARTAN POTASSIUM 100 MG: 100 TABLET, FILM COATED ORAL at 10:08

## 2024-05-10 RX ADMIN — SERTRALINE 50 MG: 50 TABLET, FILM COATED ORAL at 10:08

## 2024-05-10 RX ADMIN — PREDNISONE 5 MG: 5 TABLET ORAL at 10:06

## 2024-05-10 RX ADMIN — METOPROLOL SUCCINATE 75 MG: 50 TABLET, EXTENDED RELEASE ORAL at 10:07

## 2024-05-10 ASSESSMENT — PAIN DESCRIPTION - ORIENTATION: ORIENTATION: LOWER

## 2024-05-10 ASSESSMENT — PAIN SCALES - GENERAL: PAINLEVEL_OUTOF10: 7

## 2024-05-10 ASSESSMENT — PAIN DESCRIPTION - DESCRIPTORS: DESCRIPTORS: ACHING

## 2024-05-10 ASSESSMENT — PAIN DESCRIPTION - LOCATION: LOCATION: BACK

## 2024-05-10 NOTE — PLAN OF CARE
Problem: Discharge Planning  Goal: Discharge to home or other facility with appropriate resources  Outcome: Progressing     Problem: ABCDS Injury Assessment  Goal: Absence of physical injury  Outcome: Progressing     Problem: Safety - Adult  Goal: Free from fall injury  Outcome: Progressing     Problem: Pain  Goal: Verbalizes/displays adequate comfort level or baseline comfort level  Outcome: Progressing     Problem: Chronic Conditions and Co-morbidities  Goal: Patient's chronic conditions and co-morbidity symptoms are monitored and maintained or improved  Outcome: Progressing     Problem: Hematologic - Adult  Goal: Maintains hematologic stability  Outcome: Progressing

## 2024-05-10 NOTE — PROGRESS NOTES
[facility] Hospitalist       Hospitalist Physician Discharge Summary       [facility] [place]  [address]  [facility] [address]  [phone]          Activity level: Slowly increase as tolerated    Diet: ADULT DIET; Regular    Labs: None are pending at the discharge    Condition at discharge: Stable    Dispo: Return to home setting     Patient ID:  Blessing Campbell  [id number]  89 y.o.  12/16/1933    Admit date: 5/6/2024    Discharge date and time:  5/10/2024  7:00 AM    Admission Diagnoses: Principal Problem:    Intractable back pain  Active Problems:    Primary biliary cirrhosis (HCC)    Hx of blood clots    Arthritis    Unable to ambulate  Resolved Problems:    * No resolved hospital problems. *      Discharge Diagnoses: Principal Problem:    Intractable back pain  Active Problems:    Primary biliary cirrhosis (HCC)    Hx of blood clots    Arthritis    Unable to ambulate  Resolved Problems:    * No resolved hospital problems. *      Consults:  IP CONSULT TO SOCIAL WORK    Procedures: None significant except if described in hospital course.    Hospital Course:  History of present illness from History and Physical:  This is a 89 y.o. female  has a past medical history of Arthritis, Atrial fibrillation (HCC), Cancer (HCC), Hx of blood clots, Hypertension, and Primary biliary cirrhosis (HCC). presented with Intractable back pain, unable to ambulate  for last few days prior to arrival to the hospital.  Her long standing back pain is now getting more persistent. Exacerbated by general activity or exertion. Now unable to ambulate. On direct questioning, patient denied any  resting ongoing chest pain, resting SOB, hemoptysis, productive cough, fever, ongoing palpitation, active abdominal pain, hematemesis, rectal bleeding, radha, hematuria, any other  and GI complaints, and any new focal neuro deficits.      Acute on chronic back pain due to fractures of sacrum and transverse process of L5.  PT OT social  without lesions,   Neck supple no cervical lymphadenopathy  Heart has a regular rate and rhythm no murmur  Lungs are clear to auscultation bilaterally with equal movements.  Abdomen is soft nontender nondistended no rebound or guarding.  No  significant peripheral edema good peripheral perfusion.  No significant rashes or new skin lesions.  No new focality on neuro exam which is overall grossly intact.  Affect and mood seem to be appropriate     I/O last 3 completed shifts:  In: 300 [P.O.:300]  Out: 850 [Urine:850]  I/O this shift:  In: -   Out: 200 [Urine:200]      LABS:  Recent Labs     05/08/24  0439 05/09/24  0433 05/10/24  0456    140 142   K 3.3* 3.2* 3.9    104 105   CO2 29 26 27   BUN 19 18 25*   CREATININE 0.7 0.6 0.9   GLUCOSE 83 75 74   CALCIUM 8.6 8.4* 8.5*       No results for input(s): \"WBC\", \"RBC\", \"HGB\", \"HCT\", \"MCV\", \"MCH\", \"MCHC\", \"RDW\", \"PLT\", \"MPV\" in the last 72 hours.    No results for input(s): \"POCGLU\" in the last 72 hours.      Imaging:  CT LUMBAR SPINE WO CONTRAST    Result Date: 5/6/2024  EXAMINATION: CT OF THE LUMBAR SPINE WITHOUT CONTRAST  5/6/2024 TECHNIQUE: CT of the lumbar spine was performed without the administration of intravenous contrast. Multiplanar reformatted images are provided for review. Adjustment of mA and/or kV according to patient size was utilized.  Automated exposure control, iterative reconstruction, and/or weight based adjustment of the mA/kV was utilized to reduce the radiation dose to as low as reasonably achievable. COMPARISON: None HISTORY: ORDERING SYSTEM PROVIDED HISTORY: ro fx TECHNOLOGIST PROVIDED HISTORY: Reason for exam:->ro fx Decision Support Exception - unselect if not a suspected or confirmed emergency medical condition->Emergency Medical Condition (MA) FINDINGS: BONES/ALIGNMENT: There is an acute-appearing fracture in the right paracentral aspect of the sacrum.  Acute appearing nondisplaced fractures of the bilateral L5 transverse  collections. Joint: No significant degenerative changes. No osseous erosions.     1. Acute nondisplaced fracture with an oblique to craniocaudal orientation involving S1-S2 on the right side. 2. Fracture of the transverse process of L5 on the left.         Patient Instructions:   Current Discharge Medication List        CONTINUE these medications which have NOT CHANGED    Details   AZO-CRANBERRY PO Take by mouth      ibuprofen (ADVIL;MOTRIN) 200 MG tablet Take 2 tablets by mouth every 6 hours as needed for Pain      sertraline (ZOLOFT) 50 MG tablet Take 1 tablet by mouth daily      apixaban (ELIQUIS) 5 MG TABS tablet Take 0.5 tablets by mouth 2 times daily      metoprolol (TOPROL-XL) 25 MG XL tablet Take 1 tablet by mouth daily.  Qty: 30 tablet, Refills: 3      amLODIPine (NORVASC) 5 MG tablet Take 1 tablet by mouth daily      losartan (COZAAR) 100 MG tablet Take 1 tablet by mouth daily      PREDNISONE Take 7.5 mg by mouth daily.             STOP taking these medications       traMADol (ULTRAM) 50 MG tablet Comments:   Reason for Stopping:                 Note that more than 30 minutes was spent in preparing discharge papers, discussing discharge with patient, medication review, etc.    NOTE: This report was transcribed using voice recognition software. Every effort was made to ensure accuracy; however, inadvertent computerized transcription errors may be present.     Signed:  Electronically signed by BANDAR CHRISTIANSEN MD on 5/10/2024 at 7:00 AM

## 2024-05-10 NOTE — CARE COORDINATION
5/10/2024: SS NOTE:  Transfer confirmed with Dianne beasley @ [facility] for today 5/10, pt's daughter Marah here to transport by private car this morning, [facility]GEL [facility] completed, Nursing informed.Electronically signed by TEMO Youngblood on 5/10/2024 at 9:41 AM

## 2024-05-10 NOTE — DISCHARGE INSTRUCTIONS
Your information:  Name: Blessing Campbell  : 1933    Your instructions:  Discharge to [facility]    What to do after you leave the hospital:    Recommended diet: regular diet    Recommended activity: activity as tolerated    The following personal items were collected during your admission and were returned to you:    Belongings  Dental Appliances: None  Vision - Corrective Lenses: Eyeglasses  Hearing Aid: None  Clothing: Pants, Shirt, Slippers, At bedside  Jewelry: Necklace, At bedside, Earrings  Body Piercings Removed: No  Electronic Devices: None  Weapons (Notify Protective Services/Security): None  Home Medications: None  Valuables Given To: Patient  Provide Name(s) of Who Valuable(s) Were Given To: SELF    Information obtained by:  By signing below, I understand that if any problems occur once I leave the hospital I am to contact PCP.  I understand and acknowledge receipt of the instructions indicated above.   Back Pain: Care Instructions  Overview  In most cases, there isn't a clear cause for back pain. It may be related to problems with muscles and ligaments of the back. It may also be related to problems with the nerves, discs, or bones of the back. Moving, lifting, standing, sitting, or sleeping in an awkward way can strain the back. Arthritis is another cause of back pain.  Although it may hurt a lot, back pain usually improves on its own within several weeks. Most people recover in 12 weeks or less. Using self-care, such as ice or heat and light activity (like walking) may help you feel better sooner.  Follow-up care is a key part of your treatment and safety. Be sure to make and go to all appointments, and call your doctor if you are having problems. It's also a good idea to know your test results and keep a list of the medicines you take.  How can you care for yourself at home?  Sit or lie in positions that are most comfortable and reduce your pain. Try one of these positions when  in your health, and be sure to contact your doctor if:    You have a fever, lose weight, or don't feel well.     You do not get better as expected.   Where can you learn more?  Go to https://www.[employer].net/patientEd and enter I594 to learn more about \"Back Pain: Care Instructions.\"  Current as of: July 17, 2023               Content Version: 14.0  © 8574-4291 [employer].   Care instructions adapted under license by [employer]. If you have questions about a medical condition or this instruction, always ask your healthcare professional. [employer] disclaims any warranty or liability for your use of this information.

## 2024-05-10 NOTE — DISCHARGE SUMMARY
[facility] Hospitalist       Hospitalist Physician Discharge Summary       [facility] [place]  [address]  [facility] [address]  [phone]        Reyes Lopez MD  [address] [facility] [address] [phone]    Schedule an appointment as soon as possible for a visit in 1 week(s)        Activity level: Slowly increase as tolerated    Diet: ADULT DIET; Regular    Labs: None are pending at the discharge    Condition at discharge: Stable    Dispo: Return to home setting     Patient ID:  Blessing Campbell  [id number]  89 y.o.  12/16/1933    Admit date: 5/6/2024    Discharge date and time:  5/10/2024  1:27 PM    Admission Diagnoses: Principal Problem:    Intractable back pain  Active Problems:    Primary biliary cirrhosis (HCC)    Hx of blood clots    Arthritis    Unable to ambulate  Resolved Problems:    * No resolved hospital problems. *      Discharge Diagnoses: Principal Problem:    Intractable back pain  Active Problems:    Primary biliary cirrhosis (HCC)    Hx of blood clots    Arthritis    Unable to ambulate  Resolved Problems:    * No resolved hospital problems. *      Consults:  IP CONSULT TO SOCIAL WORK    Procedures: None significant except if described in hospital course.    Hospital Course:  History of present illness from History and Physical:  This is a 89 y.o. female  has a past medical history of Arthritis, Atrial fibrillation (HCC), Cancer (HCC), Hx of blood clots, Hypertension, and Primary biliary cirrhosis (HCC). presented with Intractable back pain, unable to ambulate  for last few days prior to arrival to the hospital.  Her long standing back pain is now getting more persistent. Exacerbated by general activity or exertion. Now unable to ambulate. On direct questioning, patient denied any  resting ongoing chest pain, resting SOB, hemoptysis, productive cough, fever, ongoing palpitation, active abdominal pain, hematemesis, rectal bleeding, radha, hematuria, any other  and GI    Height:           No acute distress moist membranes   Normocephalic, without obvious abnormality, atraumatic, external ears without lesions,   Neck supple no cervical lymphadenopathy  Heart has a regular rate and rhythm no murmur  Lungs are clear to auscultation bilaterally with equal movements.  Abdomen is soft nontender nondistended no rebound or guarding.  No  significant peripheral edema good peripheral perfusion.  No significant rashes or new skin lesions.  No new focality on neuro exam which is overall grossly intact.  Affect and mood seem to be appropriate     I/O last 3 completed shifts:  In: -   Out: 700 [Urine:700]  No intake/output data recorded.      LABS:  Recent Labs     05/08/24  0439 05/09/24  0433 05/10/24  0456    140 142   K 3.3* 3.2* 3.9    104 105   CO2 29 26 27   BUN 19 18 25*   CREATININE 0.7 0.6 0.9   GLUCOSE 83 75 74   CALCIUM 8.6 8.4* 8.5*         No results for input(s): \"WBC\", \"RBC\", \"HGB\", \"HCT\", \"MCV\", \"MCH\", \"MCHC\", \"RDW\", \"PLT\", \"MPV\" in the last 72 hours.    No results for input(s): \"POCGLU\" in the last 72 hours.      Imaging:  CT LUMBAR SPINE WO CONTRAST    Result Date: 5/6/2024  EXAMINATION: CT OF THE LUMBAR SPINE WITHOUT CONTRAST  5/6/2024 TECHNIQUE: CT of the lumbar spine was performed without the administration of intravenous contrast. Multiplanar reformatted images are provided for review. Adjustment of mA and/or kV according to patient size was utilized.  Automated exposure control, iterative reconstruction, and/or weight based adjustment of the mA/kV was utilized to reduce the radiation dose to as low as reasonably achievable. COMPARISON: None HISTORY: ORDERING SYSTEM PROVIDED HISTORY: ro fx TECHNOLOGIST PROVIDED HISTORY: Reason for exam:->ro fx Decision Support Exception - unselect if not a suspected or confirmed emergency medical condition->Emergency Medical Condition (MA) FINDINGS: BONES/ALIGNMENT: There is an acute-appearing fracture in the right paracentral  aspect of the sacrum.  Acute appearing nondisplaced fractures of the bilateral L5 transverse processes. Chronic compression fracture with kyphoplasty changes in the T10, T11 and L3 vertebral bodies.  Moderate dextroscoliotic curvature of the thoracolumbar spine with an apex at L2-3 and a Selby angle of approximately 27 degrees.  No lytic or blastic lesion. DEGENERATIVE CHANGES: No significant degenerative changes of the lumbar spine. SOFT TISSUES/RETROPERITONEUM: No paraspinal mass is seen.  2-3 mm left renal calculus.  No hydronephrosis.     1. Acute fracture in the right paracentral aspect of the sacrum. 2. Acute nondisplaced fracture in the left L5 transverse process. 3. Chronic compression fractures with kyphoplasty changes in the T10, T11 and L3 vertebral bodies. 4. Moderate dextroscoliotic curvature of the thoracolumbar spine. 5. Left nephrolithiasis.  No hydronephrosis.     CT PELVIS WO CONTRAST Additional Contrast? None    Result Date: 5/6/2024  EXAMINATION: CT OF THE PELVIS WITHOUT CONTRAST 5/6/2024 7:35 pm TECHNIQUE: CT of the pelvis was performed without the administration of intravenous contrast.  Multiplanar reformatted images are provided for review. Adjustment of mA and/or kV according to patient size was utilized.  Automated exposure control, iterative reconstruction, and/or weight based adjustment of the mA/kV was utilized to reduce the radiation dose to as low as reasonably achievable. COMPARISON: None. HISTORY ORDERING SYSTEM PROVIDED HISTORY: ro bony fx TECHNOLOGIST PROVIDED HISTORY: Reason for exam:->ro bony fx Additional Contrast?->None Decision Support Exception - unselect if not a suspected or confirmed emergency medical condition->Emergency Medical Condition (MA) FINDINGS: Bones: There is an acute nondisplaced fracture with a craniocaudal orientation involving S1-S2 on the right side.  Pelvic ring appears to be intact.  Osteopenia is present.  Proximal femora are intact.  Fracture of the

## 2024-05-13 ENCOUNTER — NURSING HOME VISIT (OUTPATIENT)
Dept: POST ACUTE CARE | Facility: EXTERNAL LOCATION | Age: 89
End: 2024-05-13
Payer: MEDICARE

## 2024-05-13 DIAGNOSIS — R53.1 WEAKNESS: Primary | ICD-10-CM

## 2024-05-13 DIAGNOSIS — F32.A DEPRESSION, UNSPECIFIED DEPRESSION TYPE: ICD-10-CM

## 2024-05-13 DIAGNOSIS — S32.10XD UNSPECIFIED FRACTURE OF SACRUM, SUBSEQUENT ENCOUNTER FOR FRACTURE WITH ROUTINE HEALING: ICD-10-CM

## 2024-05-13 DIAGNOSIS — I48.91 ATRIAL FIBRILLATION, UNSPECIFIED TYPE (MULTI): ICD-10-CM

## 2024-05-13 DIAGNOSIS — S32.009D UNSPECIFIED FRACTURE OF UNSPECIFIED LUMBAR VERTEBRA, SUBSEQUENT ENCOUNTER FOR FRACTURE WITH ROUTINE HEALING: ICD-10-CM

## 2024-05-13 DIAGNOSIS — I10 HYPERTENSION, UNSPECIFIED TYPE: ICD-10-CM

## 2024-05-13 DIAGNOSIS — K74.5 BILIARY CIRRHOSIS (MULTI): ICD-10-CM

## 2024-05-13 PROCEDURE — 99308 SBSQ NF CARE LOW MDM 20: CPT | Performed by: NURSE PRACTITIONER

## 2024-05-13 NOTE — LETTER
Patient: Rizwana Plunkett  : 1933    Encounter Date: 2024    PROGRESS NOTE    Subjective  Chief complaint: Rizwana Plunkett is a 89 y.o. female who is an acute skilled patient being seen and evaluated for weakness    HPI:  HPI  Patient is admitted to nursing facility following brief hospitalization for intractable back pain, found to have closed fracture of sacrum and transverse process of lumbar vertebrae.  Recommendations were for SNF placement for further therapy.  Therapy to evaluate patient and establish plan of care and goals.  Patient was seen and examined at the bedside, appears to be in no acute distress.    Objective  Vital signs: 146/55, 97.0, 68, 18, 94%    Physical Exam  Constitutional:       General: She is not in acute distress.  Eyes:      Extraocular Movements: Extraocular movements intact.   Cardiovascular:      Rate and Rhythm: Normal rate and regular rhythm.   Pulmonary:      Effort: Pulmonary effort is normal.      Breath sounds: Normal breath sounds.   Abdominal:      General: Bowel sounds are normal.      Palpations: Abdomen is soft.   Musculoskeletal:      Cervical back: Neck supple.      Right lower leg: No edema.      Left lower leg: No edema.   Neurological:      Mental Status: She is alert.      Motor: Weakness present.   Psychiatric:         Mood and Affect: Mood normal.         Behavior: Behavior is cooperative.         Assessment/Plan  Problem List Items Addressed This Visit       Atrial fibrillation (Multi)     Eliquis  Bleeding precautions  Metoprolol         Depression     Monitor mood and behaviors.  Sertraline         Hypertension     Monitor blood pressure  Losartan  Amlodipine  Metoprolol         Unspecified fracture of sacrum, subsequent encounter for fracture with routine healing     Therapy  Pain control         Unspecified fracture of unspecified lumbar vertebra, subsequent encounter for fracture with routine healing     Therapy  Pain control         Weakness - Primary      Therapy to evaluate         Biliary cirrhosis (Multi)     Prednisone until completed 5/21/2024          Medications, treatments, and labs reviewed  Continue medications and treatments as listed in EMR      Scribe Attestation  I, August Gupta   attest that this documentation has been prepared under the direction and in the presence of KEV Starr    Provider Attestation - Scribe documentation  All medical record entries made by the Scribe were at my direction and personally dictated by me. I have reviewed the chart and agree that the record accurately reflects my personal performance of the history, physical exam, discussion and plan.   KEV Starr        Electronically Signed By: KEV Starr   5/21/24 12:50 PM

## 2024-05-14 ENCOUNTER — NURSING HOME VISIT (OUTPATIENT)
Dept: POST ACUTE CARE | Facility: EXTERNAL LOCATION | Age: 89
End: 2024-05-14
Payer: MEDICARE

## 2024-05-14 DIAGNOSIS — S32.009D UNSPECIFIED FRACTURE OF UNSPECIFIED LUMBAR VERTEBRA, SUBSEQUENT ENCOUNTER FOR FRACTURE WITH ROUTINE HEALING: ICD-10-CM

## 2024-05-14 DIAGNOSIS — R53.1 WEAKNESS: Primary | ICD-10-CM

## 2024-05-14 DIAGNOSIS — I48.91 ATRIAL FIBRILLATION, UNSPECIFIED TYPE (MULTI): ICD-10-CM

## 2024-05-14 DIAGNOSIS — S32.10XD UNSPECIFIED FRACTURE OF SACRUM, SUBSEQUENT ENCOUNTER FOR FRACTURE WITH ROUTINE HEALING: ICD-10-CM

## 2024-05-14 DIAGNOSIS — I10 HYPERTENSION, UNSPECIFIED TYPE: ICD-10-CM

## 2024-05-14 PROBLEM — F32.A DEPRESSION: Status: ACTIVE | Noted: 2024-05-14

## 2024-05-14 PROBLEM — K74.5 BILIARY CIRRHOSIS (MULTI): Status: ACTIVE | Noted: 2024-05-14

## 2024-05-14 PROCEDURE — 99308 SBSQ NF CARE LOW MDM 20: CPT | Performed by: NURSE PRACTITIONER

## 2024-05-14 NOTE — LETTER
Patient: Rizwana Plunkett  : 1933    Encounter Date: 2024    PROGRESS NOTE    Subjective  Chief complaint: Rizwana Plunkett is a 89 y.o. female who is an acute skilled patient being seen and evaluated for weakness    HPI:  HPI  Patient does continue to work in therapy due to generalized weakness.  Patient does continue to work on bed mobility requiring min assist to be completed.  Patient is also working on gait training with front wheel walker and CGA.  Patient is able to ambulate 100 feet.  Speech therapy evaluating patient for cognition.  Patient seen and examined at the bedside, appears to be in no acute distress.  Patient denies constitutional symptoms.    Objective  Vital signs: 146/55, 97.7, 68, 18, 94%    Physical Exam  Constitutional:       General: She is not in acute distress.  Eyes:      Extraocular Movements: Extraocular movements intact.   Cardiovascular:      Rate and Rhythm: Normal rate and regular rhythm.   Pulmonary:      Effort: Pulmonary effort is normal.      Breath sounds: Normal breath sounds.   Abdominal:      General: Bowel sounds are normal.      Palpations: Abdomen is soft.   Musculoskeletal:      Cervical back: Neck supple.      Right lower leg: No edema.      Left lower leg: No edema.   Neurological:      Mental Status: She is alert.      Motor: Weakness present.   Psychiatric:         Mood and Affect: Mood normal.         Behavior: Behavior is cooperative.         Assessment/Plan  Problem List Items Addressed This Visit       Atrial fibrillation (Multi)     Eliquis  Bleeding precautions  Metoprolol         Hypertension     Monitor blood pressure  Losartan  Amlodipine  Metoprolol         Unspecified fracture of sacrum, subsequent encounter for fracture with routine healing     Therapy  Pain control         Unspecified fracture of unspecified lumbar vertebra, subsequent encounter for fracture with routine healing     Therapy  Pain control         Weakness - Primary     Continue to  progress towards goals in therapy          Medications, treatments, and labs reviewed  Continue medications and treatments as listed in EMR      Scribe Attestation  IKelley Scribe   attest that this documentation has been prepared under the direction and in the presence of KEV Starr    Provider Attestation - Scribe documentation  All medical record entries made by the Scribe were at my direction and personally dictated by me. I have reviewed the chart and agree that the record accurately reflects my personal performance of the history, physical exam, discussion and plan.   KEV Starr        Electronically Signed By: KEV Starr   5/21/24  2:10 PM

## 2024-05-14 NOTE — PROGRESS NOTES
PROGRESS NOTE    Subjective   Chief complaint: Rizwana Plunkett is a 89 y.o. female who is an acute skilled patient being seen and evaluated for weakness    HPI:  HPI  Patient is admitted to nursing facility following brief hospitalization for intractable back pain, found to have closed fracture of sacrum and transverse process of lumbar vertebrae.  Recommendations were for SNF placement for further therapy.  Therapy to evaluate patient and establish plan of care and goals.  Patient was seen and examined at the bedside, appears to be in no acute distress.    Objective   Vital signs: 146/55, 97.0, 68, 18, 94%    Physical Exam  Constitutional:       General: She is not in acute distress.  Eyes:      Extraocular Movements: Extraocular movements intact.   Cardiovascular:      Rate and Rhythm: Normal rate and regular rhythm.   Pulmonary:      Effort: Pulmonary effort is normal.      Breath sounds: Normal breath sounds.   Abdominal:      General: Bowel sounds are normal.      Palpations: Abdomen is soft.   Musculoskeletal:      Cervical back: Neck supple.      Right lower leg: No edema.      Left lower leg: No edema.   Neurological:      Mental Status: She is alert.      Motor: Weakness present.   Psychiatric:         Mood and Affect: Mood normal.         Behavior: Behavior is cooperative.         Assessment/Plan   Problem List Items Addressed This Visit       Atrial fibrillation (Multi)     Eliquis  Bleeding precautions  Metoprolol         Depression     Monitor mood and behaviors.  Sertraline         Hypertension     Monitor blood pressure  Losartan  Amlodipine  Metoprolol         Unspecified fracture of sacrum, subsequent encounter for fracture with routine healing     Therapy  Pain control         Unspecified fracture of unspecified lumbar vertebra, subsequent encounter for fracture with routine healing     Therapy  Pain control         Weakness - Primary     Therapy to evaluate         Biliary cirrhosis (Multi)      Prednisone until completed 5/21/2024          Medications, treatments, and labs reviewed  Continue medications and treatments as listed in EMR      Scribe Attestation  I, August Gupta   attest that this documentation has been prepared under the direction and in the presence of KEV Starr    Provider Attestation - Scribe documentation  All medical record entries made by the Scribe were at my direction and personally dictated by me. I have reviewed the chart and agree that the record accurately reflects my personal performance of the history, physical exam, discussion and plan.   KEV Starr

## 2024-05-15 ENCOUNTER — NURSING HOME VISIT (OUTPATIENT)
Dept: POST ACUTE CARE | Facility: EXTERNAL LOCATION | Age: 89
End: 2024-05-15
Payer: MEDICARE

## 2024-05-15 DIAGNOSIS — F32.A DEPRESSION, UNSPECIFIED DEPRESSION TYPE: ICD-10-CM

## 2024-05-15 DIAGNOSIS — S32.009D UNSPECIFIED FRACTURE OF UNSPECIFIED LUMBAR VERTEBRA, SUBSEQUENT ENCOUNTER FOR FRACTURE WITH ROUTINE HEALING: ICD-10-CM

## 2024-05-15 DIAGNOSIS — I10 HYPERTENSION, UNSPECIFIED TYPE: ICD-10-CM

## 2024-05-15 DIAGNOSIS — K74.5 BILIARY CIRRHOSIS (MULTI): ICD-10-CM

## 2024-05-15 DIAGNOSIS — S32.10XD UNSPECIFIED FRACTURE OF SACRUM, SUBSEQUENT ENCOUNTER FOR FRACTURE WITH ROUTINE HEALING: Primary | ICD-10-CM

## 2024-05-15 DIAGNOSIS — I48.91 ATRIAL FIBRILLATION, UNSPECIFIED TYPE (MULTI): ICD-10-CM

## 2024-05-15 PROBLEM — K59.00 CONSTIPATION: Status: ACTIVE | Noted: 2024-05-15

## 2024-05-15 PROCEDURE — 99305 1ST NF CARE MODERATE MDM 35: CPT | Performed by: INTERNAL MEDICINE

## 2024-05-15 NOTE — LETTER
Patient: Rizwana Plunkett  : 1933    Encounter Date: 05/15/2024    HISTORY & PHYSICAL    Subjective  Chief complaint: Rizwana Plunkett is a 89 y.o. female who is being seen and evaluated for multiple medical problems.  Patient admitted to SNF for therapy due to weakness after recent hospitalization.    HPI:  HPI  Patient had presented to ED for intractable back pain, unable to ambulate for few days prior to admission.  Patient reported back pain exacerbated by general activity and exertion, inability to ambulate at that time.  Patient did have imaging, CT that showed fracture of sacral fracture of transverse process of L5.  Patient's neuroexam was otherwise nonfocal other than pain.  Recommendations for PT OT.  PT OT evaluated patient with recommendations for SNF for further therapy and medical management.  Patient was seen and examined at the bedside, appears to be in no acute distress.    Past Medical History:   Diagnosis Date   • Atrial fibrillation (Multi)    • Biliary cirrhosis (Multi)    • Constipation    • Depression    • Dorsalgia    • Hypertension    • Osteoarthritis    • Syncope and collapse    • Unspecified fracture of sacrum, subsequent encounter for fracture with routine healing    • Unspecified fracture of unspecified lumbar vertebra, subsequent encounter for fracture with routine healing        No past surgical history on file.    Family History   Problem Relation Name Age of Onset   • No Known Problems Mother     • No Known Problems Father         Social History     Socioeconomic History   • Marital status:      Spouse name: Not on file   • Number of children: Not on file   • Years of education: Not on file   • Highest education level: Not on file   Occupational History   • Not on file   Tobacco Use   • Smoking status: Not on file   • Smokeless tobacco: Not on file   Substance and Sexual Activity   • Alcohol use: Not on file   • Drug use: Not on file   • Sexual activity: Not on file   Other  Topics Concern   • Not on file   Social History Narrative   • Not on file     Social Determinants of Health     Financial Resource Strain: Not on file   Food Insecurity: Not on file   Transportation Needs: Not on file   Physical Activity: Not on file   Stress: Not on file   Social Connections: Not on file   Intimate Partner Violence: Not on file   Housing Stability: Not on file       Vital signs: 149/82, 97.6, 79, 18,     Objective  Physical Exam  Constitutional:       General: She is not in acute distress.  Eyes:      Extraocular Movements: Extraocular movements intact.   Cardiovascular:      Rate and Rhythm: Normal rate and regular rhythm.   Pulmonary:      Effort: Pulmonary effort is normal.      Breath sounds: Normal breath sounds.   Abdominal:      General: Bowel sounds are normal.      Palpations: Abdomen is soft.   Musculoskeletal:      Cervical back: Neck supple.      Right lower leg: No edema.      Left lower leg: No edema.   Neurological:      Mental Status: She is alert.   Psychiatric:         Mood and Affect: Mood normal.         Behavior: Behavior is cooperative.         Assessment/Plan  Problem List Items Addressed This Visit       Atrial fibrillation (Multi)     Eliquis  Bleeding precautions  Metoprolol         Depression     Monitor mood and behaviors.  Sertraline         Hypertension     Monitor blood pressure  Losartan  Amlodipine  Metoprolol         Unspecified fracture of sacrum, subsequent encounter for fracture with routine healing - Primary     Therapy  Pain control         Unspecified fracture of unspecified lumbar vertebra, subsequent encounter for fracture with routine healing     Therapy  Pain control         Biliary cirrhosis (Multi)     Prednisone until completed 5/21/2024          Hospital records reviewed  Medications, treatments, and labs reviewed  Continue medications and treatments as listed in EMR  Discussed with nursing and therapy      Scribe Attestation  I, Kelley Samano,  Scribe   attest that this documentation has been prepared under the direction and in the presence of Dileep Loera MD    Provider Attestation - Scribe documentation  All medical record entries made by the Scribe were at my direction and personally dictated by me. I have reviewed the chart and agree that the record accurately reflects my personal performance of the history, physical exam, discussion and plan.   Dileep Loera MD      Electronically Signed By: Dileep Loera MD   5/15/24 12:04 PM

## 2024-05-15 NOTE — PROGRESS NOTES
HISTORY & PHYSICAL    Subjective   Chief complaint: Rizwana Plunkett is a 89 y.o. female who is being seen and evaluated for multiple medical problems.  Patient admitted to SNF for therapy due to weakness after recent hospitalization.    HPI:  HPI  Patient had presented to ED for intractable back pain, unable to ambulate for few days prior to admission.  Patient reported back pain exacerbated by general activity and exertion, inability to ambulate at that time.  Patient did have imaging, CT that showed fracture of sacral fracture of transverse process of L5.  Patient's neuroexam was otherwise nonfocal other than pain.  Recommendations for PT OT.  PT OT evaluated patient with recommendations for SNF for further therapy and medical management.  Patient was seen and examined at the bedside, appears to be in no acute distress.    Past Medical History:   Diagnosis Date    Atrial fibrillation (Multi)     Biliary cirrhosis (Multi)     Constipation     Depression     Dorsalgia     Hypertension     Osteoarthritis     Syncope and collapse     Unspecified fracture of sacrum, subsequent encounter for fracture with routine healing     Unspecified fracture of unspecified lumbar vertebra, subsequent encounter for fracture with routine healing        No past surgical history on file.    Family History   Problem Relation Name Age of Onset    No Known Problems Mother      No Known Problems Father         Social History     Socioeconomic History    Marital status:      Spouse name: Not on file    Number of children: Not on file    Years of education: Not on file    Highest education level: Not on file   Occupational History    Not on file   Tobacco Use    Smoking status: Not on file    Smokeless tobacco: Not on file   Substance and Sexual Activity    Alcohol use: Not on file    Drug use: Not on file    Sexual activity: Not on file   Other Topics Concern    Not on file   Social History Narrative    Not on file     Social  Determinants of Health     Financial Resource Strain: Not on file   Food Insecurity: Not on file   Transportation Needs: Not on file   Physical Activity: Not on file   Stress: Not on file   Social Connections: Not on file   Intimate Partner Violence: Not on file   Housing Stability: Not on file       Vital signs: 149/82, 97.6, 79, 18,     Objective   Physical Exam  Constitutional:       General: She is not in acute distress.  Eyes:      Extraocular Movements: Extraocular movements intact.   Cardiovascular:      Rate and Rhythm: Normal rate and regular rhythm.   Pulmonary:      Effort: Pulmonary effort is normal.      Breath sounds: Normal breath sounds.   Abdominal:      General: Bowel sounds are normal.      Palpations: Abdomen is soft.   Musculoskeletal:      Cervical back: Neck supple.      Right lower leg: No edema.      Left lower leg: No edema.   Neurological:      Mental Status: She is alert.   Psychiatric:         Mood and Affect: Mood normal.         Behavior: Behavior is cooperative.         Assessment/Plan   Problem List Items Addressed This Visit       Atrial fibrillation (Multi)     Eliquis  Bleeding precautions  Metoprolol         Depression     Monitor mood and behaviors.  Sertraline         Hypertension     Monitor blood pressure  Losartan  Amlodipine  Metoprolol         Unspecified fracture of sacrum, subsequent encounter for fracture with routine healing - Primary     Therapy  Pain control         Unspecified fracture of unspecified lumbar vertebra, subsequent encounter for fracture with routine healing     Therapy  Pain control         Biliary cirrhosis (Multi)     Prednisone until completed 5/21/2024          Hospital records reviewed  Medications, treatments, and labs reviewed  Continue medications and treatments as listed in EMR  Discussed with nursing and therapy      Scribe Attestation  HUGO, Mikal Guptaibvarsha   attest that this documentation has been prepared under the direction and in  the presence of Dileep Loera MD    Provider Attestation - Scribe documentation  All medical record entries made by the Scribe were at my direction and personally dictated by me. I have reviewed the chart and agree that the record accurately reflects my personal performance of the history, physical exam, discussion and plan.   Dileep Loera MD

## 2024-05-16 ENCOUNTER — NURSING HOME VISIT (OUTPATIENT)
Dept: POST ACUTE CARE | Facility: EXTERNAL LOCATION | Age: 89
End: 2024-05-16
Payer: MEDICARE

## 2024-05-16 DIAGNOSIS — I10 HYPERTENSION, UNSPECIFIED TYPE: ICD-10-CM

## 2024-05-16 DIAGNOSIS — S32.10XD UNSPECIFIED FRACTURE OF SACRUM, SUBSEQUENT ENCOUNTER FOR FRACTURE WITH ROUTINE HEALING: ICD-10-CM

## 2024-05-16 DIAGNOSIS — R53.1 WEAKNESS: Primary | ICD-10-CM

## 2024-05-16 DIAGNOSIS — I48.91 ATRIAL FIBRILLATION, UNSPECIFIED TYPE (MULTI): ICD-10-CM

## 2024-05-16 DIAGNOSIS — S32.009D UNSPECIFIED FRACTURE OF UNSPECIFIED LUMBAR VERTEBRA, SUBSEQUENT ENCOUNTER FOR FRACTURE WITH ROUTINE HEALING: ICD-10-CM

## 2024-05-16 PROCEDURE — 99308 SBSQ NF CARE LOW MDM 20: CPT | Performed by: NURSE PRACTITIONER

## 2024-05-17 ENCOUNTER — NURSING HOME VISIT (OUTPATIENT)
Dept: POST ACUTE CARE | Facility: EXTERNAL LOCATION | Age: 89
End: 2024-05-17
Payer: MEDICARE

## 2024-05-17 DIAGNOSIS — I10 HYPERTENSION, UNSPECIFIED TYPE: ICD-10-CM

## 2024-05-17 DIAGNOSIS — R53.1 WEAKNESS: Primary | ICD-10-CM

## 2024-05-17 DIAGNOSIS — S32.009D UNSPECIFIED FRACTURE OF UNSPECIFIED LUMBAR VERTEBRA, SUBSEQUENT ENCOUNTER FOR FRACTURE WITH ROUTINE HEALING: ICD-10-CM

## 2024-05-17 DIAGNOSIS — R41.0 CONFUSION: ICD-10-CM

## 2024-05-17 DIAGNOSIS — S32.10XD UNSPECIFIED FRACTURE OF SACRUM, SUBSEQUENT ENCOUNTER FOR FRACTURE WITH ROUTINE HEALING: ICD-10-CM

## 2024-05-17 DIAGNOSIS — I48.91 ATRIAL FIBRILLATION, UNSPECIFIED TYPE (MULTI): ICD-10-CM

## 2024-05-17 PROCEDURE — 99308 SBSQ NF CARE LOW MDM 20: CPT | Performed by: INTERNAL MEDICINE

## 2024-05-17 NOTE — LETTER
Patient: Rizwana Plunkett  : 1933    Encounter Date: 2024    PROGRESS NOTE    Subjective  Chief complaint: Rizwana Plunkett is a 89 y.o. female who is an acute skilled patient being seen and evaluated for weakness    HPI:  HPI  Patient does continue to work in therapy following vertebral sacral fractures.  Patient is continue to work on bed mobility, transfers, gait training.  Patient is able to ambulate up to 100 feet with front wheeled walker requiring CGA.  Reported that patient had episodes of forgetfulness and increased confusion.  Orders were placed to obtain urine culture and psych consult.  Patient seen and examined at the bedside, appears to be in no acute distress.  Denies fever or chills.    Objective  Vital signs: 149/82, 97.6, 79, 18, 79%    Physical Exam  Constitutional:       General: She is not in acute distress.  Eyes:      Extraocular Movements: Extraocular movements intact.   Cardiovascular:      Rate and Rhythm: Normal rate and regular rhythm.   Pulmonary:      Effort: Pulmonary effort is normal.      Breath sounds: Normal breath sounds.   Abdominal:      General: Bowel sounds are normal.      Palpations: Abdomen is soft.   Musculoskeletal:      Cervical back: Neck supple.      Right lower leg: No edema.      Left lower leg: No edema.   Neurological:      Mental Status: She is alert.      Motor: Weakness present.   Psychiatric:         Mood and Affect: Mood normal.         Behavior: Behavior is cooperative.         Assessment/Plan  Problem List Items Addressed This Visit       Atrial fibrillation (Multi)     Eliquis  Bleeding precautions  Metoprolol         Hypertension     Monitor blood pressure  Losartan  Amlodipine  Metoprolol         Unspecified fracture of sacrum, subsequent encounter for fracture with routine healing     Therapy  Pain control         Unspecified fracture of unspecified lumbar vertebra, subsequent encounter for fracture with routine healing     Therapy  Pain control          Weakness - Primary     Continue to work in therapy towards goals         Confusion     UC  Psych consult          Medications, treatments, and labs reviewed  Continue medications and treatments as listed in EMR      Scribe Attestation  I, August Gupta   attest that this documentation has been prepared under the direction and in the presence of Dileep Loera MD    Provider Attestation - Scribe documentation  All medical record entries made by the Scribe were at my direction and personally dictated by me. I have reviewed the chart and agree that the record accurately reflects my personal performance of the history, physical exam, discussion and plan.   Dileep Loera MD        Electronically Signed By: Dileep Loera MD   5/17/24  5:54 PM

## 2024-05-17 NOTE — PROGRESS NOTES
PROGRESS NOTE    Subjective   Chief complaint: Rizwana Plunkett is a 89 y.o. female who is an acute skilled patient being seen and evaluated for weakness    HPI:  HPI  Patient does continue to work in therapy following vertebral sacral fractures.  Patient is continue to work on bed mobility, transfers, gait training.  Patient is able to ambulate up to 100 feet with front wheeled walker requiring CGA.  Reported that patient had episodes of forgetfulness and increased confusion.  Orders were placed to obtain urine culture and psych consult.  Patient seen and examined at the bedside, appears to be in no acute distress.  Denies fever or chills.    Objective   Vital signs: 149/82, 97.6, 79, 18, 79%    Physical Exam  Constitutional:       General: She is not in acute distress.  Eyes:      Extraocular Movements: Extraocular movements intact.   Cardiovascular:      Rate and Rhythm: Normal rate and regular rhythm.   Pulmonary:      Effort: Pulmonary effort is normal.      Breath sounds: Normal breath sounds.   Abdominal:      General: Bowel sounds are normal.      Palpations: Abdomen is soft.   Musculoskeletal:      Cervical back: Neck supple.      Right lower leg: No edema.      Left lower leg: No edema.   Neurological:      Mental Status: She is alert.      Motor: Weakness present.   Psychiatric:         Mood and Affect: Mood normal.         Behavior: Behavior is cooperative.         Assessment/Plan   Problem List Items Addressed This Visit       Atrial fibrillation (Multi)     Eliquis  Bleeding precautions  Metoprolol         Hypertension     Monitor blood pressure  Losartan  Amlodipine  Metoprolol         Unspecified fracture of sacrum, subsequent encounter for fracture with routine healing     Therapy  Pain control         Unspecified fracture of unspecified lumbar vertebra, subsequent encounter for fracture with routine healing     Therapy  Pain control         Weakness - Primary     Continue to work in therapy towards  goals         Confusion     UC  Psych consult          Medications, treatments, and labs reviewed  Continue medications and treatments as listed in EMR      Scribe Attestation  I, August Gupta   attest that this documentation has been prepared under the direction and in the presence of Dileep Loera MD    Provider Attestation - Scribe documentation  All medical record entries made by the Scribe were at my direction and personally dictated by me. I have reviewed the chart and agree that the record accurately reflects my personal performance of the history, physical exam, discussion and plan.   Dileep Loera MD

## 2024-05-20 ENCOUNTER — NURSING HOME VISIT (OUTPATIENT)
Dept: POST ACUTE CARE | Facility: EXTERNAL LOCATION | Age: 89
End: 2024-05-20
Payer: MEDICARE

## 2024-05-20 DIAGNOSIS — S32.10XD UNSPECIFIED FRACTURE OF SACRUM, SUBSEQUENT ENCOUNTER FOR FRACTURE WITH ROUTINE HEALING: ICD-10-CM

## 2024-05-20 DIAGNOSIS — I10 HYPERTENSION, UNSPECIFIED TYPE: ICD-10-CM

## 2024-05-20 DIAGNOSIS — S32.009D UNSPECIFIED FRACTURE OF UNSPECIFIED LUMBAR VERTEBRA, SUBSEQUENT ENCOUNTER FOR FRACTURE WITH ROUTINE HEALING: ICD-10-CM

## 2024-05-20 DIAGNOSIS — R53.1 WEAKNESS: Primary | ICD-10-CM

## 2024-05-20 PROCEDURE — 99308 SBSQ NF CARE LOW MDM 20: CPT | Performed by: NURSE PRACTITIONER

## 2024-05-20 NOTE — LETTER
Patient: Rizwana Plunkett  : 1933    Encounter Date: 2024    PROGRESS NOTE    Subjective  Chief complaint: Rizwana Plunkett is a 89 y.o. female who is an acute skilled patient being seen and evaluated for weakness    HPI:  HPI  Patient does continue to work in therapy.  Patient is working on therapeutic exercise and activities to improve strength and endurance for overall functional mobility.  Patient does continue to work on gait training with a front wheel walker requiring CGA.  Patient seen and examined at the bedside, appears to be in no acute distress.    Objective  Vital signs: 149/95, 97.5, 79, 16, 95%    Physical Exam  Constitutional:       General: She is not in acute distress.  Eyes:      Extraocular Movements: Extraocular movements intact.   Cardiovascular:      Rate and Rhythm: Normal rate. Rhythm irregular.   Pulmonary:      Effort: Pulmonary effort is normal.      Breath sounds: Normal breath sounds.   Abdominal:      General: Bowel sounds are normal.      Palpations: Abdomen is soft.   Musculoskeletal:      Cervical back: Neck supple.      Right lower leg: No edema.      Left lower leg: No edema.   Neurological:      Mental Status: She is alert.      Motor: Weakness present.   Psychiatric:         Mood and Affect: Mood normal.         Behavior: Behavior is cooperative.         Assessment/Plan  Problem List Items Addressed This Visit       Hypertension     Monitor blood pressure  Losartan  Amlodipine  Metoprolol         Unspecified fracture of sacrum, subsequent encounter for fracture with routine healing     Therapy  Pain control         Unspecified fracture of unspecified lumbar vertebra, subsequent encounter for fracture with routine healing     Therapy  Pain control         Weakness - Primary     Continue to work in therapy towards goals          Medications, treatments, and labs reviewed  Continue medications and treatments as listed in EMR      Kelley Garcia Scribe    attest that this documentation has been prepared under the direction and in the presence of KEV Starr    Provider Attestation - Scribe documentation  All medical record entries made by the Scribe were at my direction and personally dictated by me. I have reviewed the chart and agree that the record accurately reflects my personal performance of the history, physical exam, discussion and plan.   KEV Starr        Electronically Signed By: KEV Starr   5/28/24  6:14 PM

## 2024-05-21 ENCOUNTER — NURSING HOME VISIT (OUTPATIENT)
Dept: POST ACUTE CARE | Facility: EXTERNAL LOCATION | Age: 89
End: 2024-05-21
Payer: MEDICARE

## 2024-05-21 DIAGNOSIS — S32.10XD UNSPECIFIED FRACTURE OF SACRUM, SUBSEQUENT ENCOUNTER FOR FRACTURE WITH ROUTINE HEALING: ICD-10-CM

## 2024-05-21 DIAGNOSIS — I48.91 ATRIAL FIBRILLATION, UNSPECIFIED TYPE (MULTI): ICD-10-CM

## 2024-05-21 DIAGNOSIS — S32.009D UNSPECIFIED FRACTURE OF UNSPECIFIED LUMBAR VERTEBRA, SUBSEQUENT ENCOUNTER FOR FRACTURE WITH ROUTINE HEALING: ICD-10-CM

## 2024-05-21 DIAGNOSIS — I10 HYPERTENSION, UNSPECIFIED TYPE: ICD-10-CM

## 2024-05-21 DIAGNOSIS — R53.1 WEAKNESS: Primary | ICD-10-CM

## 2024-05-21 PROCEDURE — 99308 SBSQ NF CARE LOW MDM 20: CPT | Performed by: NURSE PRACTITIONER

## 2024-05-21 NOTE — PROGRESS NOTES
PROGRESS NOTE    Subjective   Chief complaint: Rizwana Plunkett is a 89 y.o. female who is an acute skilled patient being seen and evaluated for weakness    HPI:  HPI  Patient does continue to work in therapy due to generalized weakness.  Patient does continue to work on bed mobility requiring min assist to be completed.  Patient is also working on gait training with front wheel walker and CGA.  Patient is able to ambulate 100 feet.  Speech therapy evaluating patient for cognition.  Patient seen and examined at the bedside, appears to be in no acute distress.  Patient denies constitutional symptoms.    Objective   Vital signs: 146/55, 97.7, 68, 18, 94%    Physical Exam  Constitutional:       General: She is not in acute distress.  Eyes:      Extraocular Movements: Extraocular movements intact.   Cardiovascular:      Rate and Rhythm: Normal rate and regular rhythm.   Pulmonary:      Effort: Pulmonary effort is normal.      Breath sounds: Normal breath sounds.   Abdominal:      General: Bowel sounds are normal.      Palpations: Abdomen is soft.   Musculoskeletal:      Cervical back: Neck supple.      Right lower leg: No edema.      Left lower leg: No edema.   Neurological:      Mental Status: She is alert.      Motor: Weakness present.   Psychiatric:         Mood and Affect: Mood normal.         Behavior: Behavior is cooperative.         Assessment/Plan   Problem List Items Addressed This Visit       Atrial fibrillation (Multi)     Eliquis  Bleeding precautions  Metoprolol         Hypertension     Monitor blood pressure  Losartan  Amlodipine  Metoprolol         Unspecified fracture of sacrum, subsequent encounter for fracture with routine healing     Therapy  Pain control         Unspecified fracture of unspecified lumbar vertebra, subsequent encounter for fracture with routine healing     Therapy  Pain control         Weakness - Primary     Continue to progress towards goals in therapy          Medications, treatments,  and labs reviewed  Continue medications and treatments as listed in EMR      Scribe Attestation  I, August Gupta   attest that this documentation has been prepared under the direction and in the presence of KEV Starr    Provider Attestation - Scribe documentation  All medical record entries made by the Scribe were at my direction and personally dictated by me. I have reviewed the chart and agree that the record accurately reflects my personal performance of the history, physical exam, discussion and plan.   KEV Starr

## 2024-05-21 NOTE — LETTER
Patient: Rizwana Plunkett  : 1933    Encounter Date: 2024    PROGRESS NOTE    Subjective  Chief complaint: Rizwana Plunkett is a 89 y.o. female who is an acute skilled patient being seen and evaluated for weakness    HPI:  HPI  Patient does continue to work in therapy due to generalized weakness.  Patient does continue to work towards goals, working on bed mobility, transfers, gait training.  Patient is ambulating with front wheel walker and CGA.  Patient seen and examined at the bedside, appears to be in no acute distress.  Patient denies constitutional symptoms.    Objective  Vital signs: 157/98, 98.1, 85, 16, 96%    Physical Exam  Constitutional:       General: She is not in acute distress.  Eyes:      Extraocular Movements: Extraocular movements intact.   Cardiovascular:      Rate and Rhythm: Normal rate. Rhythm irregular.   Pulmonary:      Effort: Pulmonary effort is normal.      Breath sounds: Normal breath sounds.   Abdominal:      General: Bowel sounds are normal.      Palpations: Abdomen is soft.   Musculoskeletal:      Cervical back: Neck supple.      Right lower leg: No edema.      Left lower leg: No edema.   Neurological:      Mental Status: She is alert.      Motor: Weakness present.   Psychiatric:         Mood and Affect: Mood normal.         Behavior: Behavior is cooperative.         Assessment/Plan  Problem List Items Addressed This Visit       Atrial fibrillation (Multi)     Eliquis  Bleeding precautions  Metoprolol         Hypertension     Monitor blood pressure  Losartan  Amlodipine  Metoprolol         Unspecified fracture of sacrum, subsequent encounter for fracture with routine healing     Therapy  Pain control         Unspecified fracture of unspecified lumbar vertebra, subsequent encounter for fracture with routine healing     Therapy  Pain control         Weakness - Primary     Continue to work towards goals          Medications, treatments, and labs reviewed  Continue medications and  treatments as listed in EMR      Scribe Attestation  IKelley Scribe   attest that this documentation has been prepared under the direction and in the presence of KEV Starr    Provider Attestation - Scribe documentation  All medical record entries made by the Scribe were at my direction and personally dictated by me. I have reviewed the chart and agree that the record accurately reflects my personal performance of the history, physical exam, discussion and plan.   KEV Starr        Electronically Signed By: KEV Starr   6/4/24  2:02 PM

## 2024-05-22 ENCOUNTER — NURSING HOME VISIT (OUTPATIENT)
Dept: POST ACUTE CARE | Facility: EXTERNAL LOCATION | Age: 89
End: 2024-05-22
Payer: MEDICARE

## 2024-05-22 DIAGNOSIS — S32.10XD UNSPECIFIED FRACTURE OF SACRUM, SUBSEQUENT ENCOUNTER FOR FRACTURE WITH ROUTINE HEALING: ICD-10-CM

## 2024-05-22 DIAGNOSIS — S32.009D UNSPECIFIED FRACTURE OF UNSPECIFIED LUMBAR VERTEBRA, SUBSEQUENT ENCOUNTER FOR FRACTURE WITH ROUTINE HEALING: ICD-10-CM

## 2024-05-22 DIAGNOSIS — R53.1 WEAKNESS: Primary | ICD-10-CM

## 2024-05-22 DIAGNOSIS — F32.A DEPRESSION, UNSPECIFIED DEPRESSION TYPE: ICD-10-CM

## 2024-05-22 DIAGNOSIS — I10 HYPERTENSION, UNSPECIFIED TYPE: ICD-10-CM

## 2024-05-22 DIAGNOSIS — I48.91 ATRIAL FIBRILLATION, UNSPECIFIED TYPE (MULTI): ICD-10-CM

## 2024-05-22 PROCEDURE — 99309 SBSQ NF CARE MODERATE MDM 30: CPT | Performed by: INTERNAL MEDICINE

## 2024-05-22 NOTE — PROGRESS NOTES
PROGRESS NOTE    Subjective   Chief complaint: Rizwana Plunkett is a 89 y.o. female who is an acute skilled patient being seen and evaluated for weakness and monthly general medical care and follow-up.    HPI:  HPI  Patient presents for general medical care and f/u.  Patient seen and examined at bedside.  No issues per nursing.  Patient has no acute complaints.  Patient does continue to work in therapy following fractures of lumbar and sacrum, working on bed mobility, transfers and gait training.  Patient is performing bed mobility requiring min assist to be completed.  AFIB stable, denies palpitations and chest pain.  HTN, Denies chest pain and headache.  Patient with diagnosis of depression.  Mood is stable.  Denies feeling down and thoughts of harming self or others.  No acute distress.    Objective   Vital signs: 149/104, 97.5, 87, 16, 95%    Physical Exam  Constitutional:       General: She is not in acute distress.  Eyes:      Extraocular Movements: Extraocular movements intact.   Cardiovascular:      Rate and Rhythm: Normal rate and regular rhythm.   Pulmonary:      Effort: Pulmonary effort is normal.      Breath sounds: Normal breath sounds.   Abdominal:      General: Bowel sounds are normal.      Palpations: Abdomen is soft.   Musculoskeletal:      Cervical back: Neck supple.      Right lower leg: No edema.      Left lower leg: No edema.   Neurological:      Mental Status: She is alert.      Motor: Weakness present.   Psychiatric:         Mood and Affect: Mood normal.         Behavior: Behavior is cooperative.         Assessment/Plan   Problem List Items Addressed This Visit       Atrial fibrillation (Multi)     Eliquis  Bleeding precautions  Metoprolol         Depression     Monitor mood and behaviors.  Sertraline         Hypertension     Monitor blood pressure  Losartan  Amlodipine  Metoprolol         Unspecified fracture of sacrum, subsequent encounter for fracture with routine healing     Therapy  Pain  control         Unspecified fracture of unspecified lumbar vertebra, subsequent encounter for fracture with routine healing     Therapy  Pain control         Weakness - Primary     Continue to work towards established goals in therapy          Medications, treatments, and labs reviewed  Continue medications and treatments as listed in EMR      Scribe Attestation  Kelley ARIAS Scribe   attest that this documentation has been prepared under the direction and in the presence of Dileep Loera MD    Provider Attestation - Scribe documentation  All medical record entries made by the Scribe were at my direction and personally dictated by me. I have reviewed the chart and agree that the record accurately reflects my personal performance of the history, physical exam, discussion and plan.   Dileep Loera MD

## 2024-05-22 NOTE — LETTER
Patient wants to know if Dr. Yenifer Clark received blood work results from [facility], [facility]. Patient states results were sent via fax to Dr. Fabio Evangelista office 07/24/2020. Patient is requesting call back from Dr. Heidi Rashid to discuss these results. Patient: Rizwana Plunkett  : 1933    Encounter Date: 2024    PROGRESS NOTE    Subjective  Chief complaint: Rizwana Plunkett is a 89 y.o. female who is an acute skilled patient being seen and evaluated for weakness and monthly general medical care and follow-up.    HPI:  HPI  Patient presents for general medical care and f/u.  Patient seen and examined at bedside.  No issues per nursing.  Patient has no acute complaints.  Patient does continue to work in therapy following fractures of lumbar and sacrum, working on bed mobility, transfers and gait training.  Patient is performing bed mobility requiring min assist to be completed.  AFIB stable, denies palpitations and chest pain.  HTN, Denies chest pain and headache.  Patient with diagnosis of depression.  Mood is stable.  Denies feeling down and thoughts of harming self or others.  No acute distress.    Objective  Vital signs: 149/104, 97.5, 87, 16, 95%    Physical Exam  Constitutional:       General: She is not in acute distress.  Eyes:      Extraocular Movements: Extraocular movements intact.   Cardiovascular:      Rate and Rhythm: Normal rate and regular rhythm.   Pulmonary:      Effort: Pulmonary effort is normal.      Breath sounds: Normal breath sounds.   Abdominal:      General: Bowel sounds are normal.      Palpations: Abdomen is soft.   Musculoskeletal:      Cervical back: Neck supple.      Right lower leg: No edema.      Left lower leg: No edema.   Neurological:      Mental Status: She is alert.      Motor: Weakness present.   Psychiatric:         Mood and Affect: Mood normal.         Behavior: Behavior is cooperative.         Assessment/Plan  Problem List Items Addressed This Visit       Atrial fibrillation (Multi)     Eliquis  Bleeding precautions  Metoprolol         Depression     Monitor mood and behaviors.  Sertraline         Hypertension     Monitor blood pressure  Losartan  Amlodipine  Metoprolol         Unspecified fracture of sacrum, subsequent  encounter for fracture with routine healing     Therapy  Pain control         Unspecified fracture of unspecified lumbar vertebra, subsequent encounter for fracture with routine healing     Therapy  Pain control         Weakness - Primary     Continue to work towards established goals in therapy          Medications, treatments, and labs reviewed  Continue medications and treatments as listed in EMR      Scribe Attestation  Kelley ARIAS Scribe   attest that this documentation has been prepared under the direction and in the presence of Dileep Loera MD    Provider Attestation - Scribe documentation  All medical record entries made by the Scribe were at my direction and personally dictated by me. I have reviewed the chart and agree that the record accurately reflects my personal performance of the history, physical exam, discussion and plan.   Dileep Loera MD        Electronically Signed By: Dileep Loera MD   5/22/24  2:15 PM

## 2024-05-23 ENCOUNTER — NURSING HOME VISIT (OUTPATIENT)
Dept: POST ACUTE CARE | Facility: EXTERNAL LOCATION | Age: 89
End: 2024-05-23
Payer: MEDICARE

## 2024-05-23 DIAGNOSIS — S32.10XD UNSPECIFIED FRACTURE OF SACRUM, SUBSEQUENT ENCOUNTER FOR FRACTURE WITH ROUTINE HEALING: ICD-10-CM

## 2024-05-23 DIAGNOSIS — S32.009D UNSPECIFIED FRACTURE OF UNSPECIFIED LUMBAR VERTEBRA, SUBSEQUENT ENCOUNTER FOR FRACTURE WITH ROUTINE HEALING: ICD-10-CM

## 2024-05-23 DIAGNOSIS — I10 HYPERTENSION, UNSPECIFIED TYPE: ICD-10-CM

## 2024-05-23 DIAGNOSIS — R53.1 WEAKNESS: Primary | ICD-10-CM

## 2024-05-23 PROCEDURE — 99308 SBSQ NF CARE LOW MDM 20: CPT | Performed by: NURSE PRACTITIONER

## 2024-05-23 NOTE — PROGRESS NOTES
PROGRESS NOTE    Subjective   Chief complaint: Rizwana Plunkett is a 89 y.o. female who is an acute skilled patient being seen and evaluated for weakness    HPI:  HPI  Patient is continue to work in therapy following hospitalization for sacral and lumbar vertebral fractures.  Patient continues to work on gait training with front wheel walker and CGA.  Patient is ambulating up to 100 feet.  Patient is transferring requiring CGA to min assist from various surfaces.  Patient was seen and examined at the bedside, appears to be in no acute distress.  Nursing staff voicing no new concerns.  Patient denies nausea or vomiting.  Denies fever or chills.    Objective   Vital signs: 149/82, 97.0, 79, 18, 99%    Physical Exam  Constitutional:       General: She is not in acute distress.  Eyes:      Extraocular Movements: Extraocular movements intact.   Cardiovascular:      Rate and Rhythm: Normal rate and regular rhythm.   Pulmonary:      Effort: Pulmonary effort is normal.      Breath sounds: Normal breath sounds.   Abdominal:      General: Bowel sounds are normal.      Palpations: Abdomen is soft.   Musculoskeletal:      Cervical back: Neck supple.      Right lower leg: No edema.      Left lower leg: No edema.   Neurological:      Mental Status: She is alert.      Motor: Weakness present.   Psychiatric:         Mood and Affect: Mood normal.         Behavior: Behavior is cooperative.         Assessment/Plan   Problem List Items Addressed This Visit       Atrial fibrillation (Multi)     Eliquis  Bleeding precautions  Metoprolol         Hypertension     Monitor blood pressure  Losartan  Amlodipine  Metoprolol         Unspecified fracture of sacrum, subsequent encounter for fracture with routine healing     Therapy  Pain control         Unspecified fracture of unspecified lumbar vertebra, subsequent encounter for fracture with routine healing     Therapy  Pain control         Weakness - Primary     Continue working towards goals in  therapy          Medications, treatments, and labs reviewed  Continue medications and treatments as listed in EMR      Scribe Attestation  I, August Gupta   attest that this documentation has been prepared under the direction and in the presence of KEV Starr    Provider Attestation - Scribe documentation  All medical record entries made by the Scribe were at my direction and personally dictated by me. I have reviewed the chart and agree that the record accurately reflects my personal performance of the history, physical exam, discussion and plan.   KEV Starr

## 2024-05-23 NOTE — LETTER
Patient: Rizwana Plunkett  : 1933    Encounter Date: 2024    PROGRESS NOTE    Subjective  Chief complaint: Rizwana Plunkett is a 89 y.o. female who is an acute skilled patient being seen and evaluated for weakness    HPI:  HPI  Patient is continue to work in therapy towards goals, working on bed mobility, transfers, gait training.  Patient is working on ambulating greater distances with front wheel walker. patient was seen and examined at the bedside, appears to be in no acute distress.  Patient denying nausea vomiting fever chills.    Objective  Vital signs: 150/97, 98.1, 87, 18, 97%    Physical Exam  Constitutional:       General: She is not in acute distress.  Eyes:      Extraocular Movements: Extraocular movements intact.   Cardiovascular:      Rate and Rhythm: Normal rate and regular rhythm.   Pulmonary:      Effort: Pulmonary effort is normal.      Breath sounds: Normal breath sounds.   Abdominal:      General: Bowel sounds are normal.      Palpations: Abdomen is soft.   Musculoskeletal:      Cervical back: Neck supple.      Right lower leg: No edema.      Left lower leg: No edema.   Neurological:      Mental Status: She is alert.      Motor: Weakness present.   Psychiatric:         Mood and Affect: Mood normal.         Behavior: Behavior is cooperative.         Assessment/Plan  Problem List Items Addressed This Visit       Hypertension     Monitor blood pressure  Losartan  Amlodipine  Metoprolol         Unspecified fracture of sacrum, subsequent encounter for fracture with routine healing     Therapy  Pain control         Unspecified fracture of unspecified lumbar vertebra, subsequent encounter for fracture with routine healing     Therapy  Pain control         Weakness - Primary     Continue to progress towards goals in therapy          Medications, treatments, and labs reviewed  Continue medications and treatments as listed in EMR      August Attestation  I, August Gupta   attest that this  documentation has been prepared under the direction and in the presence of KEV Starr    Provider Attestation - Scribe documentation  All medical record entries made by the Scribe were at my direction and personally dictated by me. I have reviewed the chart and agree that the record accurately reflects my personal performance of the history, physical exam, discussion and plan.   KEV Starr        Electronically Signed By: KEV Starr   6/4/24  2:36 PM

## 2024-05-24 ENCOUNTER — NURSING HOME VISIT (OUTPATIENT)
Dept: POST ACUTE CARE | Facility: EXTERNAL LOCATION | Age: 89
End: 2024-05-24
Payer: MEDICARE

## 2024-05-24 DIAGNOSIS — I10 HYPERTENSION, UNSPECIFIED TYPE: ICD-10-CM

## 2024-05-24 DIAGNOSIS — S32.10XD UNSPECIFIED FRACTURE OF SACRUM, SUBSEQUENT ENCOUNTER FOR FRACTURE WITH ROUTINE HEALING: ICD-10-CM

## 2024-05-24 DIAGNOSIS — S32.009D UNSPECIFIED FRACTURE OF UNSPECIFIED LUMBAR VERTEBRA, SUBSEQUENT ENCOUNTER FOR FRACTURE WITH ROUTINE HEALING: ICD-10-CM

## 2024-05-24 DIAGNOSIS — R53.1 WEAKNESS: Primary | ICD-10-CM

## 2024-05-24 DIAGNOSIS — I48.91 ATRIAL FIBRILLATION, UNSPECIFIED TYPE (MULTI): ICD-10-CM

## 2024-05-24 PROCEDURE — 99308 SBSQ NF CARE LOW MDM 20: CPT | Performed by: INTERNAL MEDICINE

## 2024-05-24 NOTE — PROGRESS NOTES
PROGRESS NOTE    Subjective   Chief complaint: Rizwana Plunkett is a 89 y.o. female who is an acute skilled patient being seen and evaluated for weakness    HPI:  HPI  Patient does continue to work in therapy focusing on therapeutic exercise and activities, to improve strength and endurance for overall functional mobility.  Patient also working on ADLs.  Patient is able to ambulate with front wheel walker.  Patient is status post sacral and lumbar vertebrae fractures.  Pain controlled at this time.  Speech therapy working with patient to address cognitive linguistic deficits.  Patient seen and examined at the bedside, appears to be in no acute distress.  Patient denying constitutional symptoms at this time.    Objective   Vital signs: 137, 90, 97.3, 84, 20, 96%    Physical Exam  Constitutional:       General: She is not in acute distress.  Eyes:      Extraocular Movements: Extraocular movements intact.   Cardiovascular:      Rate and Rhythm: Normal rate and regular rhythm.   Pulmonary:      Effort: Pulmonary effort is normal.      Breath sounds: Normal breath sounds.   Abdominal:      General: Bowel sounds are normal.      Palpations: Abdomen is soft.   Musculoskeletal:      Cervical back: Neck supple.      Right lower leg: No edema.      Left lower leg: No edema.   Neurological:      Mental Status: She is alert.      Motor: Weakness present.   Psychiatric:         Mood and Affect: Mood normal.         Behavior: Behavior is cooperative.         Assessment/Plan   Problem List Items Addressed This Visit       Atrial fibrillation (Multi)     Eliquis  Bleeding precautions  Metoprolol         Hypertension     Monitor blood pressure  Losartan  Amlodipine  Metoprolol         Unspecified fracture of sacrum, subsequent encounter for fracture with routine healing     Therapy  Pain control         Unspecified fracture of unspecified lumbar vertebra, subsequent encounter for fracture with routine healing     Therapy  Pain control          Weakness - Primary     Continue progressing in therapy          Medications, treatments, and labs reviewed  Continue medications and treatments as listed in EMR      Scribe Attestation  I, August Gupta   attest that this documentation has been prepared under the direction and in the presence of Dileep Loera MD    Provider Attestation - Scribe documentation  All medical record entries made by the Scribe were at my direction and personally dictated by me. I have reviewed the chart and agree that the record accurately reflects my personal performance of the history, physical exam, discussion and plan.   Dileep Loera MD

## 2024-05-24 NOTE — LETTER
Patient: Rizwana Plunkett  : 1933    Encounter Date: 2024    PROGRESS NOTE    Subjective  Chief complaint: Rizwana Plunkett is a 89 y.o. female who is an acute skilled patient being seen and evaluated for weakness    HPI:  HPI  Patient does continue to work in therapy focusing on therapeutic exercise and activities, to improve strength and endurance for overall functional mobility.  Patient also working on ADLs.  Patient is able to ambulate with front wheel walker.  Patient is status post sacral and lumbar vertebrae fractures.  Pain controlled at this time.  Speech therapy working with patient to address cognitive linguistic deficits.  Patient seen and examined at the bedside, appears to be in no acute distress.  Patient denying constitutional symptoms at this time.    Objective  Vital signs: 137, 90, 97.3, 84, 20, 96%    Physical Exam  Constitutional:       General: She is not in acute distress.  Eyes:      Extraocular Movements: Extraocular movements intact.   Cardiovascular:      Rate and Rhythm: Normal rate and regular rhythm.   Pulmonary:      Effort: Pulmonary effort is normal.      Breath sounds: Normal breath sounds.   Abdominal:      General: Bowel sounds are normal.      Palpations: Abdomen is soft.   Musculoskeletal:      Cervical back: Neck supple.      Right lower leg: No edema.      Left lower leg: No edema.   Neurological:      Mental Status: She is alert.      Motor: Weakness present.   Psychiatric:         Mood and Affect: Mood normal.         Behavior: Behavior is cooperative.         Assessment/Plan  Problem List Items Addressed This Visit       Atrial fibrillation (Multi)     Eliquis  Bleeding precautions  Metoprolol         Hypertension     Monitor blood pressure  Losartan  Amlodipine  Metoprolol         Unspecified fracture of sacrum, subsequent encounter for fracture with routine healing     Therapy  Pain control         Unspecified fracture of unspecified lumbar vertebra, subsequent  encounter for fracture with routine healing     Therapy  Pain control         Weakness - Primary     Continue progressing in therapy          Medications, treatments, and labs reviewed  Continue medications and treatments as listed in EMR      Scribe Attestation  I, August Gupta   attest that this documentation has been prepared under the direction and in the presence of Dileep Loera MD    Provider Attestation - Scribe documentation  All medical record entries made by the Scribe were at my direction and personally dictated by me. I have reviewed the chart and agree that the record accurately reflects my personal performance of the history, physical exam, discussion and plan.   Dileep Loera MD        Electronically Signed By: Dileep Loera MD   5/24/24  6:18 PM

## 2024-05-27 ENCOUNTER — NURSING HOME VISIT (OUTPATIENT)
Dept: POST ACUTE CARE | Facility: EXTERNAL LOCATION | Age: 89
End: 2024-05-27
Payer: MEDICARE

## 2024-05-27 DIAGNOSIS — I48.91 ATRIAL FIBRILLATION, UNSPECIFIED TYPE (MULTI): Primary | ICD-10-CM

## 2024-05-27 DIAGNOSIS — S32.10XD UNSPECIFIED FRACTURE OF SACRUM, SUBSEQUENT ENCOUNTER FOR FRACTURE WITH ROUTINE HEALING: ICD-10-CM

## 2024-05-27 DIAGNOSIS — I10 HYPERTENSION, UNSPECIFIED TYPE: ICD-10-CM

## 2024-05-27 DIAGNOSIS — R53.1 WEAKNESS: ICD-10-CM

## 2024-05-27 PROCEDURE — 99308 SBSQ NF CARE LOW MDM 20: CPT | Performed by: NURSE PRACTITIONER

## 2024-05-27 NOTE — LETTER
Patient: Rizwana Plunkett  : 1933    Encounter Date: 2024    PROGRESS NOTE    Subjective  Chief complaint: Rizwana Plunkett is a 89 y.o. female who is an acute skilled patient being seen and evaluated for weakness    HPI:  HPI  Patient has no acute complaints.  No new issues per nursing.  Continues working in therapy.  Uneventful night.    Objective  Vital signs: 156/90, 98.4, 69, 18, 91%    Physical Exam  Constitutional:       General: She is not in acute distress.  Eyes:      Extraocular Movements: Extraocular movements intact.   Cardiovascular:      Rate and Rhythm: Normal rate and regular rhythm.   Pulmonary:      Effort: Pulmonary effort is normal.      Breath sounds: Normal breath sounds.   Musculoskeletal:      Cervical back: Neck supple.      Right lower leg: No edema.      Left lower leg: No edema.   Neurological:      Mental Status: She is alert.      Motor: Weakness present.   Psychiatric:         Mood and Affect: Mood normal.         Behavior: Behavior is cooperative.         Assessment/Plan  Problem List Items Addressed This Visit       Atrial fibrillation (Multi) - Primary     Eliquis  Bleeding precautions  Metoprolol  Monitor heart rate and palpitations         Hypertension     Monitor blood pressure  Losartan  Amlodipine  Metoprolol         Unspecified fracture of sacrum, subsequent encounter for fracture with routine healing     Therapy  Pain controled         Weakness     Continue to work in therapy          Medications, treatments, and labs reviewed  Continue medications and treatments as listed in EMR    KEV Starr    Scribe Attestation  Suzanne ARIAS   attest that this documentation has been prepared under the direction and in the presence of KEV Starr    Provider Attestation - Scribe documentation  All medical record entries made by the Scribe were at my direction and personally dictated by me. I have reviewed the chart and agree that the record  accurately reflects my personal performance of the history, physical exam, discussion and plan.      Electronically Signed By: KEV Starr   6/9/24 10:56 AM

## 2024-05-28 ENCOUNTER — NURSING HOME VISIT (OUTPATIENT)
Dept: POST ACUTE CARE | Facility: EXTERNAL LOCATION | Age: 89
End: 2024-05-28
Payer: MEDICARE

## 2024-05-28 DIAGNOSIS — I10 HYPERTENSION, UNSPECIFIED TYPE: ICD-10-CM

## 2024-05-28 DIAGNOSIS — I48.91 ATRIAL FIBRILLATION, UNSPECIFIED TYPE (MULTI): ICD-10-CM

## 2024-05-28 DIAGNOSIS — S32.10XD UNSPECIFIED FRACTURE OF SACRUM, SUBSEQUENT ENCOUNTER FOR FRACTURE WITH ROUTINE HEALING: ICD-10-CM

## 2024-05-28 DIAGNOSIS — S32.009D UNSPECIFIED FRACTURE OF UNSPECIFIED LUMBAR VERTEBRA, SUBSEQUENT ENCOUNTER FOR FRACTURE WITH ROUTINE HEALING: ICD-10-CM

## 2024-05-28 DIAGNOSIS — R53.1 WEAKNESS: Primary | ICD-10-CM

## 2024-05-28 PROCEDURE — 99308 SBSQ NF CARE LOW MDM 20: CPT | Performed by: NURSE PRACTITIONER

## 2024-05-28 NOTE — LETTER
Patient: Rizwana Plunkett  : 1933    Encounter Date: 2024    PROGRESS NOTE    Subjective  Chief complaint: Rizwana Plunkett is a 89 y.o. female who is an acute skilled patient being seen and evaluated for weakness    HPI:  HPI  Patient presents for f/u therapy and general medical care.  Patient seen and examined at bedside.  Therapy has been working with the patient to improve strength, endurance, ADLs, and transfers d/t generalized weakness following hospitalization for lumbar and sacral fracture.  Patient has no acute concerns today.  Nursing staff voicing no new concerns.    Objective  Vital signs: 156/90, 98.4, 66, 18, 94%    Physical Exam  Constitutional:       General: She is not in acute distress.  Eyes:      Extraocular Movements: Extraocular movements intact.   Cardiovascular:      Rate and Rhythm: Normal rate and regular rhythm.   Pulmonary:      Effort: Pulmonary effort is normal.      Breath sounds: Normal breath sounds.   Abdominal:      General: Bowel sounds are normal.      Palpations: Abdomen is soft.   Musculoskeletal:      Cervical back: Neck supple.      Right lower leg: No edema.      Left lower leg: No edema.   Neurological:      Mental Status: She is alert.      Motor: Weakness present.   Psychiatric:         Mood and Affect: Mood normal.         Behavior: Behavior is cooperative.         Assessment/Plan  Problem List Items Addressed This Visit       Atrial fibrillation (Multi)     Eliquis  Bleeding precautions  Metoprolol  Monitor heart rate and palpitations         Hypertension     Monitor blood pressure  Losartan  Amlodipine  Metoprolol         Unspecified fracture of sacrum, subsequent encounter for fracture with routine healing     Therapy  Pain control         Unspecified fracture of unspecified lumbar vertebra, subsequent encounter for fracture with routine healing     Therapy  Pain control         Weakness - Primary     Continue progressing in therapy          Medications,  treatments, and labs reviewed  Continue medications and treatments as listed in EMR      Scribe Attestation  I, August Gupta   attest that this documentation has been prepared under the direction and in the presence of KEV Starr    Provider Attestation - Scribe documentation  All medical record entries made by the Scribe were at my direction and personally dictated by me. I have reviewed the chart and agree that the record accurately reflects my personal performance of the history, physical exam, discussion and plan.   KEV Starr        Electronically Signed By: KEV Starr   6/5/24  4:20 PM

## 2024-05-28 NOTE — PROGRESS NOTES
PROGRESS NOTE    Subjective   Chief complaint: Rizwana Plunkett is a 89 y.o. female who is an acute skilled patient being seen and evaluated for weakness    HPI:  HPI  Patient does continue to work in therapy.  Patient is working on therapeutic exercise and activities to improve strength and endurance for overall functional mobility.  Patient does continue to work on gait training with a front wheel walker requiring CGA.  Patient seen and examined at the bedside, appears to be in no acute distress.    Objective   Vital signs: 149/95, 97.5, 79, 16, 95%    Physical Exam  Constitutional:       General: She is not in acute distress.  Eyes:      Extraocular Movements: Extraocular movements intact.   Cardiovascular:      Rate and Rhythm: Normal rate. Rhythm irregular.   Pulmonary:      Effort: Pulmonary effort is normal.      Breath sounds: Normal breath sounds.   Abdominal:      General: Bowel sounds are normal.      Palpations: Abdomen is soft.   Musculoskeletal:      Cervical back: Neck supple.      Right lower leg: No edema.      Left lower leg: No edema.   Neurological:      Mental Status: She is alert.      Motor: Weakness present.   Psychiatric:         Mood and Affect: Mood normal.         Behavior: Behavior is cooperative.         Assessment/Plan   Problem List Items Addressed This Visit       Hypertension     Monitor blood pressure  Losartan  Amlodipine  Metoprolol         Unspecified fracture of sacrum, subsequent encounter for fracture with routine healing     Therapy  Pain control         Unspecified fracture of unspecified lumbar vertebra, subsequent encounter for fracture with routine healing     Therapy  Pain control         Weakness - Primary     Continue to work in therapy towards goals          Medications, treatments, and labs reviewed  Continue medications and treatments as listed in EMR      Scribe Attestation  HUGO, August Gupta   attest that this documentation has been prepared under the  direction and in the presence of KEV Starr    Provider Attestation - Scribe documentation  All medical record entries made by the Scribe were at my direction and personally dictated by me. I have reviewed the chart and agree that the record accurately reflects my personal performance of the history, physical exam, discussion and plan.   KEV Starr

## 2024-05-29 ENCOUNTER — NURSING HOME VISIT (OUTPATIENT)
Dept: POST ACUTE CARE | Facility: EXTERNAL LOCATION | Age: 89
End: 2024-05-29
Payer: MEDICARE

## 2024-05-29 DIAGNOSIS — S32.009D UNSPECIFIED FRACTURE OF UNSPECIFIED LUMBAR VERTEBRA, SUBSEQUENT ENCOUNTER FOR FRACTURE WITH ROUTINE HEALING: ICD-10-CM

## 2024-05-29 DIAGNOSIS — I48.91 ATRIAL FIBRILLATION, UNSPECIFIED TYPE (MULTI): ICD-10-CM

## 2024-05-29 DIAGNOSIS — R53.1 WEAKNESS: Primary | ICD-10-CM

## 2024-05-29 DIAGNOSIS — I10 HYPERTENSION, UNSPECIFIED TYPE: ICD-10-CM

## 2024-05-29 DIAGNOSIS — S32.10XD UNSPECIFIED FRACTURE OF SACRUM, SUBSEQUENT ENCOUNTER FOR FRACTURE WITH ROUTINE HEALING: ICD-10-CM

## 2024-05-29 PROCEDURE — 99308 SBSQ NF CARE LOW MDM 20: CPT | Performed by: INTERNAL MEDICINE

## 2024-05-29 NOTE — LETTER
Patient: Rizwana Plunkett  : 1933    Encounter Date: 2024    PROGRESS NOTE    Subjective  Chief complaint: Rizwana Plunkett is a 89 y.o. female who is an acute skilled patient being seen and evaluated for weakness    HPI:  HPI  Patient does continue to work in therapy due to generalized weakness.  Patient is status post vertebral and sacral fracture.  Patient is making improvements, gaining strength.  Patient is to be transferring to assisted living next week.  Patient was seen and examined at the bedside, appears to be in no acute distress.  Nursing staff voicing no new concerns.  Patient is stable.  Pain is controlled.    Objective  Vital signs: 146/80, 98.0, 66, 18, 94%    Physical Exam  Constitutional:       General: She is not in acute distress.  Eyes:      Extraocular Movements: Extraocular movements intact.   Cardiovascular:      Rate and Rhythm: Normal rate and regular rhythm.   Pulmonary:      Effort: Pulmonary effort is normal.      Breath sounds: Normal breath sounds.   Abdominal:      General: Bowel sounds are normal.      Palpations: Abdomen is soft.   Musculoskeletal:      Cervical back: Neck supple.      Right lower leg: No edema.      Left lower leg: No edema.   Neurological:      Mental Status: She is alert.      Motor: Weakness present.   Psychiatric:         Mood and Affect: Mood normal.         Behavior: Behavior is cooperative.         Assessment/Plan  Problem List Items Addressed This Visit       Atrial fibrillation (Multi)     Eliquis  Bleeding precautions  Metoprolol         Hypertension     Monitor blood pressure  Losartan  Amlodipine  Metoprolol         Unspecified fracture of sacrum, subsequent encounter for fracture with routine healing     Therapy  Pain control         Unspecified fracture of unspecified lumbar vertebra, subsequent encounter for fracture with routine healing     Therapy  Pain control         Weakness - Primary     Continue making improvements in therapy           Medications, treatments, and labs reviewed  Continue medications and treatments as listed in EMR      Scribe Attestation  I, August Gupta   attest that this documentation has been prepared under the direction and in the presence of Dileep Loera MD    Provider Attestation - Scribe documentation  All medical record entries made by the Scribe were at my direction and personally dictated by me. I have reviewed the chart and agree that the record accurately reflects my personal performance of the history, physical exam, discussion and plan.   Dileep Loera MD        Electronically Signed By: Dileep Loera MD   5/29/24  5:54 PM

## 2024-05-29 NOTE — PROGRESS NOTES
PROGRESS NOTE    Subjective   Chief complaint: Rizwana Plunkett is a 89 y.o. female who is an acute skilled patient being seen and evaluated for weakness    HPI:  HPI  Patient does continue to work in therapy due to generalized weakness.  Patient is status post vertebral and sacral fracture.  Patient is making improvements, gaining strength.  Patient is to be transferring to assisted living next week.  Patient was seen and examined at the bedside, appears to be in no acute distress.  Nursing staff voicing no new concerns.  Patient is stable.  Pain is controlled.    Objective   Vital signs: 146/80, 98.0, 66, 18, 94%    Physical Exam  Constitutional:       General: She is not in acute distress.  Eyes:      Extraocular Movements: Extraocular movements intact.   Cardiovascular:      Rate and Rhythm: Normal rate and regular rhythm.   Pulmonary:      Effort: Pulmonary effort is normal.      Breath sounds: Normal breath sounds.   Abdominal:      General: Bowel sounds are normal.      Palpations: Abdomen is soft.   Musculoskeletal:      Cervical back: Neck supple.      Right lower leg: No edema.      Left lower leg: No edema.   Neurological:      Mental Status: She is alert.      Motor: Weakness present.   Psychiatric:         Mood and Affect: Mood normal.         Behavior: Behavior is cooperative.         Assessment/Plan   Problem List Items Addressed This Visit       Atrial fibrillation (Multi)     Eliquis  Bleeding precautions  Metoprolol         Hypertension     Monitor blood pressure  Losartan  Amlodipine  Metoprolol         Unspecified fracture of sacrum, subsequent encounter for fracture with routine healing     Therapy  Pain control         Unspecified fracture of unspecified lumbar vertebra, subsequent encounter for fracture with routine healing     Therapy  Pain control         Weakness - Primary     Continue making improvements in therapy          Medications, treatments, and labs reviewed  Continue medications and  treatments as listed in EMR      Scribe Attestation  I, August Gupta   attest that this documentation has been prepared under the direction and in the presence of Dileep Loera MD    Provider Attestation - Scribe documentation  All medical record entries made by the Scribe were at my direction and personally dictated by me. I have reviewed the chart and agree that the record accurately reflects my personal performance of the history, physical exam, discussion and plan.   Dileep Loera MD

## 2024-05-30 PROBLEM — S32.009D: Status: RESOLVED | Noted: 2024-05-14 | Resolved: 2024-05-30

## 2024-05-30 NOTE — PROGRESS NOTES
PROGRESS NOTE    Subjective   Chief complaint: Rizwana Plunkett is a 89 y.o. female who is an acute skilled patient being seen and evaluated for weakness    HPI:  HPI  Patient is continue to work in therapy towards goals, working on bed mobility, transfers, gait training.  Patient is working on ambulating greater distances with front wheel walker. patient was seen and examined at the bedside, appears to be in no acute distress.  Patient denying nausea vomiting fever chills.    Objective   Vital signs: 150/97, 98.1, 87, 18, 97%    Physical Exam  Constitutional:       General: She is not in acute distress.  Eyes:      Extraocular Movements: Extraocular movements intact.   Cardiovascular:      Rate and Rhythm: Normal rate and regular rhythm.   Pulmonary:      Effort: Pulmonary effort is normal.      Breath sounds: Normal breath sounds.   Abdominal:      General: Bowel sounds are normal.      Palpations: Abdomen is soft.   Musculoskeletal:      Cervical back: Neck supple.      Right lower leg: No edema.      Left lower leg: No edema.   Neurological:      Mental Status: She is alert.      Motor: Weakness present.   Psychiatric:         Mood and Affect: Mood normal.         Behavior: Behavior is cooperative.         Assessment/Plan   Problem List Items Addressed This Visit       Hypertension     Monitor blood pressure  Losartan  Amlodipine  Metoprolol         Unspecified fracture of sacrum, subsequent encounter for fracture with routine healing     Therapy  Pain control         Unspecified fracture of unspecified lumbar vertebra, subsequent encounter for fracture with routine healing     Therapy  Pain control         Weakness - Primary     Continue to progress towards goals in therapy          Medications, treatments, and labs reviewed  Continue medications and treatments as listed in EMR      Scribe Attestation  I, August Gupta   attest that this documentation has been prepared under the direction and in the  presence of KEV Starr    Provider Attestation - Scribe documentation  All medical record entries made by the Scribe were at my direction and personally dictated by me. I have reviewed the chart and agree that the record accurately reflects my personal performance of the history, physical exam, discussion and plan.   KEV Starr

## 2024-05-30 NOTE — PROGRESS NOTES
PROGRESS NOTE    Subjective   Chief complaint: Rizwana Plunkett is a 89 y.o. female who is an acute skilled patient being seen and evaluated for weakness    HPI:  HPI  Patient does continue to work in therapy due to generalized weakness.  Patient does continue to work towards goals, working on bed mobility, transfers, gait training.  Patient is ambulating with front wheel walker and CGA.  Patient seen and examined at the bedside, appears to be in no acute distress.  Patient denies constitutional symptoms.    Objective   Vital signs: 157/98, 98.1, 85, 16, 96%    Physical Exam  Constitutional:       General: She is not in acute distress.  Eyes:      Extraocular Movements: Extraocular movements intact.   Cardiovascular:      Rate and Rhythm: Normal rate. Rhythm irregular.   Pulmonary:      Effort: Pulmonary effort is normal.      Breath sounds: Normal breath sounds.   Abdominal:      General: Bowel sounds are normal.      Palpations: Abdomen is soft.   Musculoskeletal:      Cervical back: Neck supple.      Right lower leg: No edema.      Left lower leg: No edema.   Neurological:      Mental Status: She is alert.      Motor: Weakness present.   Psychiatric:         Mood and Affect: Mood normal.         Behavior: Behavior is cooperative.         Assessment/Plan   Problem List Items Addressed This Visit       Atrial fibrillation (Multi)     Eliquis  Bleeding precautions  Metoprolol         Hypertension     Monitor blood pressure  Losartan  Amlodipine  Metoprolol         Unspecified fracture of sacrum, subsequent encounter for fracture with routine healing     Therapy  Pain control         Unspecified fracture of unspecified lumbar vertebra, subsequent encounter for fracture with routine healing     Therapy  Pain control         Weakness - Primary     Continue to work towards goals          Medications, treatments, and labs reviewed  Continue medications and treatments as listed in EMR      Kelley Garcia  August Samano   attest that this documentation has been prepared under the direction and in the presence of KEV Starr    Provider Attestation - Scribe documentation  All medical record entries made by the Scribe were at my direction and personally dictated by me. I have reviewed the chart and agree that the record accurately reflects my personal performance of the history, physical exam, discussion and plan.   KEV Starr

## 2024-05-31 ENCOUNTER — NURSING HOME VISIT (OUTPATIENT)
Dept: POST ACUTE CARE | Facility: EXTERNAL LOCATION | Age: 89
End: 2024-05-31
Payer: MEDICARE

## 2024-05-31 DIAGNOSIS — I48.91 ATRIAL FIBRILLATION, UNSPECIFIED TYPE (MULTI): ICD-10-CM

## 2024-05-31 DIAGNOSIS — R53.1 WEAKNESS: Primary | ICD-10-CM

## 2024-05-31 DIAGNOSIS — I10 HYPERTENSION, UNSPECIFIED TYPE: ICD-10-CM

## 2024-05-31 DIAGNOSIS — S32.10XD UNSPECIFIED FRACTURE OF SACRUM, SUBSEQUENT ENCOUNTER FOR FRACTURE WITH ROUTINE HEALING: ICD-10-CM

## 2024-05-31 DIAGNOSIS — S32.009D UNSPECIFIED FRACTURE OF UNSPECIFIED LUMBAR VERTEBRA, SUBSEQUENT ENCOUNTER FOR FRACTURE WITH ROUTINE HEALING: ICD-10-CM

## 2024-05-31 PROCEDURE — 99308 SBSQ NF CARE LOW MDM 20: CPT | Performed by: INTERNAL MEDICINE

## 2024-05-31 NOTE — PROGRESS NOTES
PROGRESS NOTE    Subjective   Chief complaint: Rizwana Plunkett is a 89 y.o. female who is an acute skilled patient being seen and evaluated for weakness    HPI:  HPI  Patient is working in therapy towards goals.  Patient is able to complete bed mobility with supervision.  Patient is improving with transfers and now require supervision to complete from various surfaces.  Patient also ambulating increasing distances, up to 350 feet with rolling walker and supervision.  Speech therapy working with patient to address cognition.  Patient seen and examined at the bedside, appears to be in no acute distress.    Objective   Vital signs: 131/71, 97.1, 65, 18, 95%    Physical Exam  Constitutional:       General: She is not in acute distress.  Eyes:      Extraocular Movements: Extraocular movements intact.   Cardiovascular:      Rate and Rhythm: Normal rate and regular rhythm.   Pulmonary:      Effort: Pulmonary effort is normal.      Breath sounds: Normal breath sounds.   Abdominal:      General: Bowel sounds are normal.      Palpations: Abdomen is soft.   Musculoskeletal:      Cervical back: Neck supple.      Right lower leg: No edema.      Left lower leg: No edema.   Neurological:      Mental Status: She is alert.      Motor: Weakness present.   Psychiatric:         Mood and Affect: Mood normal.         Behavior: Behavior is cooperative.         Assessment/Plan   Problem List Items Addressed This Visit       Atrial fibrillation (Multi)     Eliquis  Bleeding precautions  Metoprolol  Monitor heart rate and palpitations         Hypertension     Monitor blood pressure  Losartan  Amlodipine  Metoprolol         Unspecified fracture of sacrum, subsequent encounter for fracture with routine healing     Therapy  Pain control         Unspecified fracture of unspecified lumbar vertebra, subsequent encounter for fracture with routine healing     Therapy  Pain control         Weakness - Primary     Continue to work in therapy, improving           Medications, treatments, and labs reviewed  Continue medications and treatments as listed in EMR      Scribe Attestation  I, August Gupta   attest that this documentation has been prepared under the direction and in the presence of Dileep Loera MD    Provider Attestation - Scribe documentation  All medical record entries made by the Scribe were at my direction and personally dictated by me. I have reviewed the chart and agree that the record accurately reflects my personal performance of the history, physical exam, discussion and plan.   Dileep Loera MD

## 2024-05-31 NOTE — LETTER
Patient: Rizwana Plunkett  : 1933    Encounter Date: 2024    PROGRESS NOTE    Subjective  Chief complaint: Rizwana Plunkett is a 89 y.o. female who is an acute skilled patient being seen and evaluated for weakness    HPI:  HPI  Patient is working in therapy towards goals.  Patient is able to complete bed mobility with supervision.  Patient is improving with transfers and now require supervision to complete from various surfaces.  Patient also ambulating increasing distances, up to 350 feet with rolling walker and supervision.  Speech therapy working with patient to address cognition.  Patient seen and examined at the bedside, appears to be in no acute distress.    Objective  Vital signs: 131/71, 97.1, 65, 18, 95%    Physical Exam  Constitutional:       General: She is not in acute distress.  Eyes:      Extraocular Movements: Extraocular movements intact.   Cardiovascular:      Rate and Rhythm: Normal rate and regular rhythm.   Pulmonary:      Effort: Pulmonary effort is normal.      Breath sounds: Normal breath sounds.   Abdominal:      General: Bowel sounds are normal.      Palpations: Abdomen is soft.   Musculoskeletal:      Cervical back: Neck supple.      Right lower leg: No edema.      Left lower leg: No edema.   Neurological:      Mental Status: She is alert.      Motor: Weakness present.   Psychiatric:         Mood and Affect: Mood normal.         Behavior: Behavior is cooperative.         Assessment/Plan  Problem List Items Addressed This Visit       Atrial fibrillation (Multi)     Eliquis  Bleeding precautions  Metoprolol  Monitor heart rate and palpitations         Hypertension     Monitor blood pressure  Losartan  Amlodipine  Metoprolol         Unspecified fracture of sacrum, subsequent encounter for fracture with routine healing     Therapy  Pain control         Unspecified fracture of unspecified lumbar vertebra, subsequent encounter for fracture with routine healing     Therapy  Pain control          Weakness - Primary     Continue to work in therapy, improving          Medications, treatments, and labs reviewed  Continue medications and treatments as listed in EMR      Scribe Attestation  I, August Gupta   attest that this documentation has been prepared under the direction and in the presence of Dileep Loera MD    Provider Attestation - Scribe documentation  All medical record entries made by the Scribe were at my direction and personally dictated by me. I have reviewed the chart and agree that the record accurately reflects my personal performance of the history, physical exam, discussion and plan.   Dileep Loera MD        Electronically Signed By: Dileep Loera MD   5/31/24  5:28 PM

## 2024-06-02 NOTE — PROGRESS NOTES
PROGRESS NOTE    Subjective   Chief complaint: Rizwana Plunkett is a 89 y.o. female who is an acute skilled patient being seen and evaluated for weakness    HPI:  HPI  Patient has no acute complaints.  No new issues per nursing.  Continues working in therapy.  Uneventful night.    Objective   Vital signs: 156/90, 98.4, 69, 18, 91%    Physical Exam  Constitutional:       General: She is not in acute distress.  Eyes:      Extraocular Movements: Extraocular movements intact.   Cardiovascular:      Rate and Rhythm: Normal rate and regular rhythm.   Pulmonary:      Effort: Pulmonary effort is normal.      Breath sounds: Normal breath sounds.   Musculoskeletal:      Cervical back: Neck supple.      Right lower leg: No edema.      Left lower leg: No edema.   Neurological:      Mental Status: She is alert.      Motor: Weakness present.   Psychiatric:         Mood and Affect: Mood normal.         Behavior: Behavior is cooperative.         Assessment/Plan   Problem List Items Addressed This Visit       Atrial fibrillation (Multi) - Primary     Eliquis  Bleeding precautions  Metoprolol  Monitor heart rate and palpitations         Hypertension     Monitor blood pressure  Losartan  Amlodipine  Metoprolol         Unspecified fracture of sacrum, subsequent encounter for fracture with routine healing     Therapy  Pain controled         Weakness     Continue to work in therapy          Medications, treatments, and labs reviewed  Continue medications and treatments as listed in EMR    KEV Starr    Scribe Attestation  Suzanne ARIASibvarsha   attest that this documentation has been prepared under the direction and in the presence of KEV Starr    Provider Attestation - Scribe documentation  All medical record entries made by the Scribe were at my direction and personally dictated by me. I have reviewed the chart and agree that the record accurately reflects my personal performance of the history,  physical exam, discussion and plan.

## 2024-06-03 ENCOUNTER — NURSING HOME VISIT (OUTPATIENT)
Dept: POST ACUTE CARE | Facility: EXTERNAL LOCATION | Age: 89
End: 2024-06-03
Payer: MEDICARE

## 2024-06-03 DIAGNOSIS — R53.1 WEAKNESS: Primary | ICD-10-CM

## 2024-06-03 DIAGNOSIS — S32.10XD UNSPECIFIED FRACTURE OF SACRUM, SUBSEQUENT ENCOUNTER FOR FRACTURE WITH ROUTINE HEALING: ICD-10-CM

## 2024-06-03 DIAGNOSIS — I48.91 ATRIAL FIBRILLATION, UNSPECIFIED TYPE (MULTI): ICD-10-CM

## 2024-06-03 DIAGNOSIS — I10 HYPERTENSION, UNSPECIFIED TYPE: ICD-10-CM

## 2024-06-03 PROCEDURE — 99308 SBSQ NF CARE LOW MDM 20: CPT | Performed by: NURSE PRACTITIONER

## 2024-06-03 NOTE — LETTER
Patient: Rizwana Plunkett  : 1933    Encounter Date: 2024    PROGRESS NOTE    Subjective  Chief complaint: Rizwana Plunkett is a 89 y.o. female who is an acute skilled patient being seen and evaluated for weakness    HPI:  HPI  An interactive audio and/or video telecommunication system which permits real time communications between the patient (at the originating site) and provider (at a distant site) was utilized to provide this telehealth service after obtaining verbal consent.  Patient does continue to work in therapy working on bed mobility, transfers and gait training.  Patient will be discharging tomorrow, 6\4.  Patient appears to be in no acute distress.  Nursing staff voicing no new concerns.  Patient denies fever or chills.  Denies nausea or vomiting.    Objective  Vital signs: 157/94, 98.5, 18, 77, 94%    Physical Exam  Constitutional:       General: She is not in acute distress.  Eyes:      Extraocular Movements: Extraocular movements intact.   Pulmonary:      Effort: Pulmonary effort is normal.   Musculoskeletal:      Cervical back: Neck supple.   Neurological:      Mental Status: She is alert.      Motor: Weakness present.   Psychiatric:         Mood and Affect: Mood normal.         Behavior: Behavior is cooperative.         Assessment/Plan  Problem List Items Addressed This Visit       Atrial fibrillation (Multi)     Eliquis  Bleeding precautions  Metoprolol  Monitor heart rate and palpitations         Hypertension     Monitor blood pressure  Losartan  Amlodipine  Metoprolol         Unspecified fracture of sacrum, subsequent encounter for fracture with routine healing     Therapy  Pain control         Weakness - Primary     Continue to work towards goals, discharging tomorrow          Medications, treatments, and labs reviewed  Continue medications and treatments as listed in EMR      Mikalibvarsha Attestation  I, August Gupta   attest that this documentation has been prepared under the  direction and in the presence of KEV Starr    Provider Attestation - Scribe documentation  All medical record entries made by the Scribe were at my direction and personally dictated by me. I have reviewed the chart and agree that the record accurately reflects my personal performance of the history, physical exam, discussion and plan.   KEV Starr        Electronically Signed By: KEV Starr   6/8/24  8:49 AM

## 2024-06-03 NOTE — PROGRESS NOTES
PROGRESS NOTE    Subjective   Chief complaint: Rizwana Plunkett is a 89 y.o. female who is an acute skilled patient being seen and evaluated for weakness    HPI:  HPI  Patient presents for f/u therapy and general medical care.  Patient seen and examined at bedside.  Therapy has been working with the patient to improve strength, endurance, ADLs, and transfers d/t generalized weakness following hospitalization for lumbar and sacral fracture.  Patient has no acute concerns today.  Nursing staff voicing no new concerns.    Objective   Vital signs: 156/90, 98.4, 66, 18, 94%    Physical Exam  Constitutional:       General: She is not in acute distress.  Eyes:      Extraocular Movements: Extraocular movements intact.   Cardiovascular:      Rate and Rhythm: Normal rate and regular rhythm.   Pulmonary:      Effort: Pulmonary effort is normal.      Breath sounds: Normal breath sounds.   Abdominal:      General: Bowel sounds are normal.      Palpations: Abdomen is soft.   Musculoskeletal:      Cervical back: Neck supple.      Right lower leg: No edema.      Left lower leg: No edema.   Neurological:      Mental Status: She is alert.      Motor: Weakness present.   Psychiatric:         Mood and Affect: Mood normal.         Behavior: Behavior is cooperative.         Assessment/Plan   Problem List Items Addressed This Visit       Atrial fibrillation (Multi)     Eliquis  Bleeding precautions  Metoprolol  Monitor heart rate and palpitations         Hypertension     Monitor blood pressure  Losartan  Amlodipine  Metoprolol         Unspecified fracture of sacrum, subsequent encounter for fracture with routine healing     Therapy  Pain control         Unspecified fracture of unspecified lumbar vertebra, subsequent encounter for fracture with routine healing     Therapy  Pain control         Weakness - Primary     Continue progressing in therapy          Medications, treatments, and labs reviewed  Continue medications and treatments as  listed in EMR      Scribe Attestation  I, August Gupta   attest that this documentation has been prepared under the direction and in the presence of KEV Starr    Provider Attestation - Scribe documentation  All medical record entries made by the Scribe were at my direction and personally dictated by me. I have reviewed the chart and agree that the record accurately reflects my personal performance of the history, physical exam, discussion and plan.   KEV Starr

## 2024-06-04 ENCOUNTER — TELEPHONE (OUTPATIENT)
Dept: POST ACUTE CARE | Facility: EXTERNAL LOCATION | Age: 89
End: 2024-06-04
Payer: MEDICARE

## 2024-06-04 NOTE — PROGRESS NOTES
PROGRESS NOTE    Subjective   Chief complaint: Rizwana Plunkett is a 89 y.o. female who is an acute skilled patient being seen and evaluated for weakness    HPI:  HPI  An interactive audio and/or video telecommunication system which permits real time communications between the patient (at the originating site) and provider (at a distant site) was utilized to provide this telehealth service after obtaining verbal consent.  Patient does continue to work in therapy working on bed mobility, transfers and gait training.  Patient will be discharging tomorrow, 6\4.  Patient appears to be in no acute distress.  Nursing staff voicing no new concerns.  Patient denies fever or chills.  Denies nausea or vomiting.    Objective   Vital signs: 157/94, 98.5, 18, 77, 94%    Physical Exam  Constitutional:       General: She is not in acute distress.  Eyes:      Extraocular Movements: Extraocular movements intact.   Pulmonary:      Effort: Pulmonary effort is normal.   Musculoskeletal:      Cervical back: Neck supple.   Neurological:      Mental Status: She is alert.      Motor: Weakness present.   Psychiatric:         Mood and Affect: Mood normal.         Behavior: Behavior is cooperative.         Assessment/Plan   Problem List Items Addressed This Visit       Atrial fibrillation (Multi)     Eliquis  Bleeding precautions  Metoprolol  Monitor heart rate and palpitations         Hypertension     Monitor blood pressure  Losartan  Amlodipine  Metoprolol         Unspecified fracture of sacrum, subsequent encounter for fracture with routine healing     Therapy  Pain control         Weakness - Primary     Continue to work towards goals, discharging tomorrow          Medications, treatments, and labs reviewed  Continue medications and treatments as listed in EMR      Scribe Attestation  HUGO, August Gupta   attest that this documentation has been prepared under the direction and in the presence of KEV Starr    Provider  Attestation - Scribe documentation  All medical record entries made by the Scribe were at my direction and personally dictated by me. I have reviewed the chart and agree that the record accurately reflects my personal performance of the history, physical exam, discussion and plan.   Shama Bang, APRN-CNP

## 2024-06-13 ENCOUNTER — NURSING HOME VISIT (OUTPATIENT)
Dept: POST ACUTE CARE | Facility: EXTERNAL LOCATION | Age: 89
End: 2024-06-13
Payer: MEDICARE

## 2024-06-13 DIAGNOSIS — I10 HYPERTENSION, UNSPECIFIED TYPE: Primary | ICD-10-CM

## 2024-06-13 DIAGNOSIS — F41.9 ANXIETY: ICD-10-CM

## 2024-06-13 DIAGNOSIS — I48.91 ATRIAL FIBRILLATION, UNSPECIFIED TYPE (MULTI): ICD-10-CM

## 2024-06-13 DIAGNOSIS — S32.10XD UNSPECIFIED FRACTURE OF SACRUM, SUBSEQUENT ENCOUNTER FOR FRACTURE WITH ROUTINE HEALING: ICD-10-CM

## 2024-06-13 DIAGNOSIS — R19.7 DIARRHEA, UNSPECIFIED TYPE: ICD-10-CM

## 2024-06-13 PROCEDURE — 99495 TRANSJ CARE MGMT MOD F2F 14D: CPT | Performed by: NURSE PRACTITIONER

## 2024-06-13 NOTE — LETTER
Patient: Rizwana Plunkett  : 1933    Encounter Date: 2024    HISTORY & PHYSICAL    Subjective  Chief complaint: Rizwana Plunkett is a 89 y.o. female who is being seen and evaluated for multiple medical problems.  Patient presents for admission to assisted living.    HPI:  HPI  Patient is admitted to assisted living facility following rehab and hospital stay.  Patient was status post fracture of the sacrum and lumbar vertebrae.  Patient completed therapy course.  Nursing called  stating that patient's daughter reporting patient with moderate anxiety which is worse at night.  In addition patient with complaints of diarrhea.  Denies abdominal pain.  Denies nausea vomiting fever chills.    Past Medical History:   Diagnosis Date   • Atrial fibrillation (Multi)    • Biliary cirrhosis (Multi)    • Constipation    • Depression    • Dorsalgia    • Hypertension    • Osteoarthritis    • Syncope and collapse    • Unspecified fracture of sacrum, subsequent encounter for fracture with routine healing    • Unspecified fracture of unspecified lumbar vertebra, subsequent encounter for fracture with routine healing        No past surgical history on file.    Family History   Problem Relation Name Age of Onset   • No Known Problems Mother     • No Known Problems Father         Social History     Socioeconomic History   • Marital status:      Spouse name: Not on file   • Number of children: Not on file   • Years of education: Not on file   • Highest education level: Not on file   Occupational History   • Not on file   Tobacco Use   • Smoking status: Not on file   • Smokeless tobacco: Not on file   Substance and Sexual Activity   • Alcohol use: Not on file   • Drug use: Not on file   • Sexual activity: Not on file   Other Topics Concern   • Not on file   Social History Narrative   • Not on file     Social Determinants of Health     Financial Resource Strain: Not on file   Food Insecurity: No Food Insecurity (2024)     Received from [facility]    Hunger Vital Sign    • Worried About Running Out of Food in the Last Year: Never true    • Ran Out of Food in the Last Year: Never true   Transportation Needs: No Transportation Needs (5/6/2024)    Received from [facility]    PRAPARE - Transportation    • Lack of Transportation (Medical): No    • Lack of Transportation (Non-Medical): No   Physical Activity: Not on file   Stress: Not on file   Social Connections: Not on file   Intimate Partner Violence: Not on file   Housing Stability: Low Risk  (5/6/2024)    Received from [facility]    Housing Stability Vital Sign    • Unable to Pay for Housing in the Last Year: No    • Number of Places Lived in the Last Year: 1    • Unstable Housing in the Last Year: No       Vital signs: 135/81, 97.7, 71, 18, 94%    Objective  Physical Exam  Constitutional:       General: She is not in acute distress.  Eyes:      Extraocular Movements: Extraocular movements intact.   Cardiovascular:      Rate and Rhythm: Normal rate and regular rhythm.   Pulmonary:      Effort: Pulmonary effort is normal.      Breath sounds: Normal breath sounds.   Musculoskeletal:      Cervical back: Neck supple.      Right lower leg: Edema present.      Left lower leg: Edema present.   Neurological:      Mental Status: She is alert.   Psychiatric:         Mood and Affect: Mood normal.         Behavior: Behavior is cooperative.         Assessment/Plan  Problem List Items Addressed This Visit       Anxiety     Increase Zoloft to 75 mg p.o. at bedtime  Monitor         Atrial fibrillation (Multi)     Eliquis  Bleeding precautions  Metoprolol  Monitor heart rate and palpitations         Diarrhea     Hold stool softeners x 2 days and monitor         Hypertension - Primary     Monitor blood pressure  Losartan  Amlodipine  Metoprolol         Unspecified fracture of sacrum, subsequent encounter for fracture with routine  healing     Discharged from SNF s/p therapy  Pain controled          Hospital records reviewed  Medications, treatments, and labs reviewed  Continue medications and treatments as listed in EMR  Discussed with nursing and therapy      Scribe Attestation  I, August Gupta   attest that this documentation has been prepared under the direction and in the presence of KEV Starr    Provider Attestation - Scribe documentation  All medical record entries made by the Scribe were at my direction and personally dictated by me. I have reviewed the chart and agree that the record accurately reflects my personal performance of the history, physical exam, discussion and plan.   KEV Starr      Electronically Signed By: KEV Starr   6/23/24  9:22 PM

## 2024-06-18 PROBLEM — F41.9 ANXIETY: Status: ACTIVE | Noted: 2024-06-18

## 2024-06-18 PROBLEM — R19.7 DIARRHEA: Status: ACTIVE | Noted: 2024-06-18

## 2024-06-18 NOTE — PROGRESS NOTES
HISTORY & PHYSICAL    Subjective   Chief complaint: Rizwana Plunkett is a 89 y.o. female who is being seen and evaluated for multiple medical problems.  Patient presents for admission to assisted living.    HPI:  HPI  Patient is admitted to assisted living facility following rehab and hospital stay.  Patient was status post fracture of the sacrum and lumbar vertebrae.  Patient completed therapy course.  Nursing called 6/11 stating that patient's daughter reporting patient with moderate anxiety which is worse at night.  In addition patient with complaints of diarrhea.  Denies abdominal pain.  Denies nausea vomiting fever chills.    Past Medical History:   Diagnosis Date    Atrial fibrillation (Multi)     Biliary cirrhosis (Multi)     Constipation     Depression     Dorsalgia     Hypertension     Osteoarthritis     Syncope and collapse     Unspecified fracture of sacrum, subsequent encounter for fracture with routine healing     Unspecified fracture of unspecified lumbar vertebra, subsequent encounter for fracture with routine healing        No past surgical history on file.    Family History   Problem Relation Name Age of Onset    No Known Problems Mother      No Known Problems Father         Social History     Socioeconomic History    Marital status:      Spouse name: Not on file    Number of children: Not on file    Years of education: Not on file    Highest education level: Not on file   Occupational History    Not on file   Tobacco Use    Smoking status: Not on file    Smokeless tobacco: Not on file   Substance and Sexual Activity    Alcohol use: Not on file    Drug use: Not on file    Sexual activity: Not on file   Other Topics Concern    Not on file   Social History Narrative    Not on file     Social Determinants of Health     Financial Resource Strain: Not on file   Food Insecurity: No Food Insecurity (5/6/2024)    Received from [facility]    Hunger Vital Sign     Worried About  Running Out of Food in the Last Year: Never true     Ran Out of Food in the Last Year: Never true   Transportation Needs: No Transportation Needs (5/6/2024)    Received from [facility]    PRAPARE - Transportation     Lack of Transportation (Medical): No     Lack of Transportation (Non-Medical): No   Physical Activity: Not on file   Stress: Not on file   Social Connections: Not on file   Intimate Partner Violence: Not on file   Housing Stability: Low Risk  (5/6/2024)    Received from [facility]    Housing Stability Vital Sign     Unable to Pay for Housing in the Last Year: No     Number of Places Lived in the Last Year: 1     Unstable Housing in the Last Year: No       Vital signs: 135/81, 97.7, 71, 18, 94%    Objective   Physical Exam  Constitutional:       General: She is not in acute distress.  Eyes:      Extraocular Movements: Extraocular movements intact.   Cardiovascular:      Rate and Rhythm: Normal rate and regular rhythm.   Pulmonary:      Effort: Pulmonary effort is normal.      Breath sounds: Normal breath sounds.   Musculoskeletal:      Cervical back: Neck supple.      Right lower leg: Edema present.      Left lower leg: Edema present.   Neurological:      Mental Status: She is alert.   Psychiatric:         Mood and Affect: Mood normal.         Behavior: Behavior is cooperative.         Assessment/Plan   Problem List Items Addressed This Visit       Anxiety     Increase Zoloft to 75 mg p.o. at bedtime  Monitor         Atrial fibrillation (Multi)     Eliquis  Bleeding precautions  Metoprolol  Monitor heart rate and palpitations         Diarrhea     Hold stool softeners x 2 days and monitor         Hypertension - Primary     Monitor blood pressure  Losartan  Amlodipine  Metoprolol         Unspecified fracture of sacrum, subsequent encounter for fracture with routine healing     Discharged from SNF s/p therapy  Pain controled          Hospital records  reviewed  Medications, treatments, and labs reviewed  Continue medications and treatments as listed in EMR  Discussed with nursing and therapy      Scribe Attestation  I, August Gupta   attest that this documentation has been prepared under the direction and in the presence of KEV Starr    Provider Attestation - Scribe documentation  All medical record entries made by the Scribe were at my direction and personally dictated by me. I have reviewed the chart and agree that the record accurately reflects my personal performance of the history, physical exam, discussion and plan.   KEV Starr

## 2024-06-24 NOTE — TELEPHONE ENCOUNTER
Patient discharged from SNF and admitted to AL.  Spoke with nursing staff and reviewed medications and plan of care.  No new concerns per patient and/or nursing staff.

## 2024-06-27 ENCOUNTER — NURSING HOME VISIT (OUTPATIENT)
Dept: POST ACUTE CARE | Facility: EXTERNAL LOCATION | Age: 89
End: 2024-06-27
Payer: MEDICARE

## 2024-06-27 DIAGNOSIS — R19.7 DIARRHEA, UNSPECIFIED TYPE: ICD-10-CM

## 2024-06-27 DIAGNOSIS — I10 HYPERTENSION, UNSPECIFIED TYPE: ICD-10-CM

## 2024-06-27 DIAGNOSIS — I48.91 ATRIAL FIBRILLATION, UNSPECIFIED TYPE (MULTI): ICD-10-CM

## 2024-06-27 DIAGNOSIS — F41.9 ANXIETY: ICD-10-CM

## 2024-06-27 DIAGNOSIS — F03.90 DEMENTIA, UNSPECIFIED DEMENTIA SEVERITY, UNSPECIFIED DEMENTIA TYPE, UNSPECIFIED WHETHER BEHAVIORAL, PSYCHOTIC, OR MOOD DISTURBANCE OR ANXIETY (MULTI): Primary | ICD-10-CM

## 2024-06-27 PROCEDURE — 99349 HOME/RES VST EST MOD MDM 40: CPT | Performed by: NURSE PRACTITIONER

## 2024-06-27 NOTE — LETTER
"Patient: Rizwana Plunkett  : 1933    Encounter Date: 2024    PROGRESS NOTE    Subjective  Chief complaint: Rizwana Plunkett is a 89 y.o. female who is an assisted living facility patient being seen and evaluated for diarrhea and dementia.    HPI:  HPI  Nursing called yesterday reported that patient was \"\" according to nursing, progressively worsening.  Patient has increased confusion which is worse at night.  Orders were placed to obtain UC BMP, CBC, speech therapy for cognition and consider Namenda.  Labs obtained were unremarkable.  Patient also complains of loose stool.  Denies abdominal pain.  Denies nausea vomiting fever chills.    Objective  Vital signs: 135/81, 97.7, 71, 18, 94%    Physical Exam  Constitutional:       General: She is not in acute distress.  Eyes:      Extraocular Movements: Extraocular movements intact.   Cardiovascular:      Rate and Rhythm: Normal rate and regular rhythm.   Pulmonary:      Effort: Pulmonary effort is normal.      Breath sounds: Normal breath sounds.   Musculoskeletal:      Cervical back: Neck supple.      Right lower leg: Edema present.      Left lower leg: Edema present.   Neurological:      Mental Status: She is alert.      Comments: Face symmetrical  Hand grasps equal  Speech clear   Psychiatric:         Mood and Affect: Mood normal.         Behavior: Behavior is cooperative.         Assessment/Plan  Problem List Items Addressed This Visit       Anxiety     Zoloft recently increased to 75 mg p.o. at bedtime  Calm at this time  Continue to monitor         Atrial fibrillation (Multi)     Eliquis  Bleeding precautions  Metoprolol  Monitor heart rate and palpitations         Dementia (Multi) - Primary     Recent change of environment from hospital to SNF to AL may be contributing  No overt signs of illness  Workup pending         Diarrhea     Change senna to as needed         Hypertension     BP at goal  Monitor blood " pressure  Losartan  Amlodipine  Metoprolol          Medications, treatments, and labs reviewed  Continue medications and treatments as listed in EMR    Scribe Attestation  I, August Gupta   attest that this documentation has been prepared under the direction and in the presence of KEV Starr    Provider Attestation - Scribe documentation  All medical record entries made by the Scribe were at my direction and personally dictated by me. I have reviewed the chart and agree that the record accurately reflects my personal performance of the history, physical exam, discussion and plan.   KEV Starr      Electronically Signed By: KEV Starr   7/5/24  7:16 AM

## 2024-07-01 PROBLEM — F03.90 DEMENTIA (MULTI): Status: ACTIVE | Noted: 2024-07-01

## 2024-07-01 NOTE — ASSESSMENT & PLAN NOTE
Recent change of environment from hospital to SNF to AL may be contributing  No overt signs of illness  Workup pending

## 2024-07-01 NOTE — PROGRESS NOTES
"PROGRESS NOTE    Subjective   Chief complaint: Rizwana Plunkett is a 89 y.o. female who is an assisted living facility patient being seen and evaluated for diarrhea and dementia.    HPI:  HPI  Nursing called yesterday reported that patient was \"sundowning\" according to nursing, progressively worsening.  Patient has increased confusion which is worse at night.  Orders were placed to obtain UC BMP, CBC, speech therapy for cognition and consider Namenda.  Labs obtained were unremarkable.  Patient also complains of loose stool.  Denies abdominal pain.  Denies nausea vomiting fever chills.    Objective   Vital signs: 135/81, 97.7, 71, 18, 94%    Physical Exam  Constitutional:       General: She is not in acute distress.  Eyes:      Extraocular Movements: Extraocular movements intact.   Cardiovascular:      Rate and Rhythm: Normal rate and regular rhythm.   Pulmonary:      Effort: Pulmonary effort is normal.      Breath sounds: Normal breath sounds.   Musculoskeletal:      Cervical back: Neck supple.      Right lower leg: Edema present.      Left lower leg: Edema present.   Neurological:      Mental Status: She is alert.      Comments: Face symmetrical  Hand grasps equal  Speech clear   Psychiatric:         Mood and Affect: Mood normal.         Behavior: Behavior is cooperative.         Assessment/Plan   Problem List Items Addressed This Visit       Anxiety     Zoloft recently increased to 75 mg p.o. at bedtime  Calm at this time  Continue to monitor         Atrial fibrillation (Multi)     Eliquis  Bleeding precautions  Metoprolol  Monitor heart rate and palpitations         Dementia (Multi) - Primary     Recent change of environment from hospital to SNF to AL may be contributing  No overt signs of illness  Workup pending         Diarrhea     Change senna to as needed         Hypertension     BP at goal  Monitor blood pressure  Losartan  Amlodipine  Metoprolol          Medications, treatments, and labs reviewed  Continue " medications and treatments as listed in EMR    Scribe Attestation  IKelley Scribe   attest that this documentation has been prepared under the direction and in the presence of KEV Starr    Provider Attestation - Scribe documentation  All medical record entries made by the Scribe were at my direction and personally dictated by me. I have reviewed the chart and agree that the record accurately reflects my personal performance of the history, physical exam, discussion and plan.   KEV Starr

## 2024-07-05 PROBLEM — K59.00 CONSTIPATION: Status: RESOLVED | Noted: 2024-05-15 | Resolved: 2024-07-05

## 2024-07-15 ENCOUNTER — NURSING HOME VISIT (OUTPATIENT)
Dept: POST ACUTE CARE | Facility: EXTERNAL LOCATION | Age: 89
End: 2024-07-15
Payer: MEDICARE

## 2024-07-15 DIAGNOSIS — R41.0 CONFUSION: ICD-10-CM

## 2024-07-15 DIAGNOSIS — R19.7 DIARRHEA, UNSPECIFIED TYPE: ICD-10-CM

## 2024-07-15 DIAGNOSIS — I10 HYPERTENSION, UNSPECIFIED TYPE: Primary | ICD-10-CM

## 2024-07-15 PROCEDURE — 99348 HOME/RES VST EST LOW MDM 30: CPT | Performed by: NURSE PRACTITIONER

## 2024-07-15 NOTE — LETTER
Patient: Rizwana Plunkett  : 1933    Encounter Date: 07/15/2024    PROGRESS NOTE    Subjective  Chief complaint: Rizwana Plunkett is a 89 y.o. female who is an assisted living facility patient being seen and evaluated for  f/u diarrhea and confusion    HPI:  HPI    Patient recently had changes to bowel regime due to complaints of loose stool.  She states that since then , her bowel movements have improved and she no longer has loose stools.  In addition nursing staff reports with cognitive decline and confusion.  Family states that patient did have similar occurrence when she transferred from hospital to skilled nursing facility prior to admit to the assisted living.  Otherwise no new concerns.  Denies constitutional symptoms    Objective  Vital signs: .  132/70, 98.3, 79, 16, 98%    Physical Exam  Constitutional:       General: She is not in acute distress.  Eyes:      Extraocular Movements: Extraocular movements intact.   Cardiovascular:      Rate and Rhythm: Normal rate and regular rhythm.   Pulmonary:      Effort: Pulmonary effort is normal.      Breath sounds: Normal breath sounds.   Musculoskeletal:      Cervical back: Neck supple.      Right lower leg: Edema present.      Left lower leg: Edema present.   Neurological:      Mental Status: She is alert.   Psychiatric:         Mood and Affect: Mood normal.         Behavior: Behavior is cooperative.         Assessment/Plan  Problem List Items Addressed This Visit       Confusion     dementia vs encephalopathy  ST consult for cognition         Diarrhea      resolved         Hypertension - Primary     BP at goal  Monitor blood pressure  Losartan  Amlodipine  Metoprolol          Medications, treatments, and labs reviewed  Continue medications and treatments as listed in EMR    KEV Starr      Electronically Signed By: KEV Starr   24  5:12 PM

## 2024-07-28 NOTE — PROGRESS NOTES
PROGRESS NOTE    Subjective   Chief complaint: Rizwana Plunkett is a 89 y.o. female who is an assisted living facility patient being seen and evaluated for  f/u diarrhea and confusion    HPI:  HPI    Patient recently had changes to bowel regime due to complaints of loose stool.  She states that since then , her bowel movements have improved and she no longer has loose stools.  In addition nursing staff reports with cognitive decline and confusion.  Family states that patient did have similar occurrence when she transferred from hospital to skilled nursing facility prior to admit to the assisted living.  Otherwise no new concerns.  Denies constitutional symptoms    Objective   Vital signs: .  132/70, 98.3, 79, 16, 98%    Physical Exam  Constitutional:       General: She is not in acute distress.  Eyes:      Extraocular Movements: Extraocular movements intact.   Cardiovascular:      Rate and Rhythm: Normal rate and regular rhythm.   Pulmonary:      Effort: Pulmonary effort is normal.      Breath sounds: Normal breath sounds.   Musculoskeletal:      Cervical back: Neck supple.      Right lower leg: Edema present.      Left lower leg: Edema present.   Neurological:      Mental Status: She is alert.   Psychiatric:         Mood and Affect: Mood normal.         Behavior: Behavior is cooperative.         Assessment/Plan   Problem List Items Addressed This Visit       Confusion     dementia vs encephalopathy  ST consult for cognition         Diarrhea      resolved         Hypertension - Primary     BP at goal  Monitor blood pressure  Losartan  Amlodipine  Metoprolol          Medications, treatments, and labs reviewed  Continue medications and treatments as listed in EMR    Shama Bang, APRN-CNP

## 2024-08-22 ENCOUNTER — NURSING HOME VISIT (OUTPATIENT)
Dept: POST ACUTE CARE | Facility: EXTERNAL LOCATION | Age: 89
End: 2024-08-22
Payer: MEDICARE

## 2024-08-22 DIAGNOSIS — R41.0 CONFUSION: Primary | ICD-10-CM

## 2024-08-22 DIAGNOSIS — I10 HYPERTENSION, UNSPECIFIED TYPE: ICD-10-CM

## 2024-08-22 DIAGNOSIS — I48.91 ATRIAL FIBRILLATION, UNSPECIFIED TYPE (MULTI): ICD-10-CM

## 2024-08-22 DIAGNOSIS — F03.90 DEMENTIA, UNSPECIFIED DEMENTIA SEVERITY, UNSPECIFIED DEMENTIA TYPE, UNSPECIFIED WHETHER BEHAVIORAL, PSYCHOTIC, OR MOOD DISTURBANCE OR ANXIETY (MULTI): ICD-10-CM

## 2024-08-22 PROCEDURE — 99349 HOME/RES VST EST MOD MDM 40: CPT | Performed by: NURSE PRACTITIONER

## 2024-08-22 NOTE — LETTER
Patient: Rizwana Plunkett  : 1933    Encounter Date: 2024    PROGRESS NOTE    Subjective  Chief complaint: Rizwana Plunkett is a 89 y.o. female who is an assisted living facility patient being seen and evaluated for general medical care and monthly follow-up.    HPI:  HPIPatient presents for general medical care and monthly follow-up. Patient resides in assisted living facility due to need for assist with care. Nurse called on  and reported patient with increased confusion. I ordered BMP, CBC and urine culture. UC pending. In addition, staff call on  reporting patient “digging in her nose”. Daughter concerned d/t hx of skin cancer and requesting patient be evaluated for this. Patient seen and examined at bedside in no apparent distress. Pleasantly confused.      Objective  Vital signs: 165/67 - 96.2-96-18-93%    Physical Exam  Constitutional:       General: She is not in acute distress.  HENT:      Nose:      Comments: No lesions noted in nose  Eyes:      Extraocular Movements: Extraocular movements intact.   Cardiovascular:      Rate and Rhythm: Normal rate and regular rhythm.   Pulmonary:      Effort: Pulmonary effort is normal.      Breath sounds: Normal breath sounds.   Musculoskeletal:      Cervical back: Neck supple.   Neurological:      Mental Status: She is alert.   Psychiatric:         Mood and Affect: Mood normal.         Behavior: Behavior is cooperative.         Assessment/Plan  Problem List Items Addressed This Visit       Atrial fibrillation (Multi)     Eliquis  Bleeding precautions  Metoprolol  Monitor heart rate and palpitations         Confusion - Primary     Likely 2/2 progression of dementia  UC pending  Medications reviewed         Dementia (Multi)     Recent change of environment from hospital to SNF to AL may be contributing  No overt signs of illness  UC pending         Hypertension     Monitor blood pressure  Losartan  Amlodipine  Metoprolol          Medications, treatments, and  labs reviewed  Continue medications and treatments as listed in EMR    Scribe Attestation  IRakesh Scribe   attest that this documentation has been prepared under the direction and in the presence of KEV Starr    Provider Attestation - Scribe documentation  All medical record entries made by the Scribe were at my direction and personally dictated by me. I have reviewed the chart and agree that the record accurately reflects my personal performance of the history, physical exam, discussion and plan.   KEV Starr      Electronically Signed By: KEV Starr   8/28/24  8:29 PM

## 2024-08-28 NOTE — PROGRESS NOTES
PROGRESS NOTE    Subjective   Chief complaint: Rizwana Plunkett is a 89 y.o. female who is an assisted living facility patient being seen and evaluated for general medical care and monthly follow-up.    HPI:  HPIPatient presents for general medical care and monthly follow-up. Patient resides in assisted living facility due to need for assist with care. Nurse called on 8/19 and reported patient with increased confusion. I ordered BMP, CBC and urine culture. UC pending. In addition, staff call on 8/21 reporting patient “digging in her nose”. Daughter concerned d/t hx of skin cancer and requesting patient be evaluated for this. Patient seen and examined at bedside in no apparent distress. Pleasantly confused.      Objective   Vital signs: 165/67 - 96.2-96-18-93%    Physical Exam  Constitutional:       General: She is not in acute distress.  HENT:      Nose:      Comments: No lesions noted in nose  Eyes:      Extraocular Movements: Extraocular movements intact.   Cardiovascular:      Rate and Rhythm: Normal rate and regular rhythm.   Pulmonary:      Effort: Pulmonary effort is normal.      Breath sounds: Normal breath sounds.   Musculoskeletal:      Cervical back: Neck supple.   Neurological:      Mental Status: She is alert.   Psychiatric:         Mood and Affect: Mood normal.         Behavior: Behavior is cooperative.         Assessment/Plan   Problem List Items Addressed This Visit       Atrial fibrillation (Multi)     Eliquis  Bleeding precautions  Metoprolol  Monitor heart rate and palpitations         Confusion - Primary     Likely 2/2 progression of dementia  UC pending  Medications reviewed         Dementia (Multi)     Recent change of environment from hospital to SNF to AL may be contributing  No overt signs of illness  UC pending         Hypertension     Monitor blood pressure  Losartan  Amlodipine  Metoprolol          Medications, treatments, and labs reviewed  Continue medications and treatments as listed in  EMR    Scribe Attestation  I, August Santoro   attest that this documentation has been prepared under the direction and in the presence of KEV Starr    Provider Attestation - Scribe documentation  All medical record entries made by the Scribe were at my direction and personally dictated by me. I have reviewed the chart and agree that the record accurately reflects my personal performance of the history, physical exam, discussion and plan.   KEV Starr

## 2024-08-29 ENCOUNTER — NURSING HOME VISIT (OUTPATIENT)
Dept: POST ACUTE CARE | Facility: EXTERNAL LOCATION | Age: 89
End: 2024-08-29
Payer: MEDICARE

## 2024-08-29 DIAGNOSIS — R26.89 POOR BALANCE: ICD-10-CM

## 2024-08-29 DIAGNOSIS — R53.1 WEAKNESS: Primary | ICD-10-CM
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
## 2024-08-29 DIAGNOSIS — N39.0 URINARY TRACT INFECTION WITHOUT HEMATURIA, SITE UNSPECIFIED: ICD-10-CM

## 2024-08-29 DIAGNOSIS — F03.90 DEMENTIA, UNSPECIFIED DEMENTIA SEVERITY, UNSPECIFIED DEMENTIA TYPE, UNSPECIFIED WHETHER BEHAVIORAL, PSYCHOTIC, OR MOOD DISTURBANCE OR ANXIETY (MULTI): ICD-10-CM

## 2024-08-29 PROCEDURE — 99348 HOME/RES VST EST LOW MDM 30: CPT | Performed by: NURSE PRACTITIONER

## 2024-08-29 NOTE — LETTER
Patient: Rizwana Plunkett  : 1933    Encounter Date: 2024    PROGRESS NOTE    Subjective  Chief complaint: Rizwana Plunkett is a 89 y.o. female who is an assisted living facility patient being seen and evaluated for fall.    HPI:  HPI  Patient present with c/o fall.  Nurse called on  and reported patient had a fall.  Patient seen and examined at bedside in no apparent distress.  Daughter present and feels patient has been gradually declining.  Patient noted to have poor balance and muscle weakness and well as impaired cognition.  She had labs which were unremarkable.  UC notable for aerococcus sanguinicola.  Patient denies dysuria n/v/f/c.       Objective  Vital signs: 181/81, 97.9, 73, 24, 92%    Physical Exam  Constitutional:       General: She is not in acute distress.  Eyes:      Extraocular Movements: Extraocular movements intact.   Cardiovascular:      Rate and Rhythm: Normal rate and regular rhythm.   Pulmonary:      Effort: Pulmonary effort is normal.      Breath sounds: Normal breath sounds.   Musculoskeletal:      Cervical back: Neck supple.      Comments: Knees buckling anf poor balance noted when standing with walker     Good ROM of extremities   Neurological:      Mental Status: She is alert.   Psychiatric:         Mood and Affect: Mood normal.         Behavior: Behavior is cooperative.         Assessment/Plan  Problem List Items Addressed This Visit       Weakness - Primary     PT OT consult         Dementia (Multi)     Assist with ADLs and care as needed  Speech therapy consult         Poor balance     PT OT consult         Fall     No apparent injury         UTI (urinary tract infection)     Start Macrodantin          Medications, treatments, and labs reviewed  Continue medications and treatments as listed in EMR    Scribe Attestation  I, August Gupta   attest that this documentation has been prepared under the direction and in the presence of Shama Bang,  NED-CNP    Provider Attestation - Scribe documentation  All medical record entries made by the Scribe were at my direction and personally dictated by me. I have reviewed the chart and agree that the record accurately reflects my personal performance of the history, physical exam, discussion and plan.   KEV Starr      Electronically Signed By: KEV Starr   9/4/24  8:28 PM

## 2024-08-29 NOTE — ASSESSMENT & PLAN NOTE
Recent change of environment from hospital to SNF to AL may be contributing  No overt signs of illness  UC pending

## 2024-09-03 ENCOUNTER — NURSING HOME VISIT (OUTPATIENT)
Dept: POST ACUTE CARE | Facility: EXTERNAL LOCATION | Age: 89
End: 2024-09-03
Payer: MEDICARE

## 2024-09-03 DIAGNOSIS — F03.90 DEMENTIA, UNSPECIFIED DEMENTIA SEVERITY, UNSPECIFIED DEMENTIA TYPE, UNSPECIFIED WHETHER BEHAVIORAL, PSYCHOTIC, OR MOOD DISTURBANCE OR ANXIETY (MULTI): ICD-10-CM

## 2024-09-03 DIAGNOSIS — R53.81 DECLINING FUNCTIONAL STATUS: ICD-10-CM

## 2024-09-03 DIAGNOSIS — N39.0 URINARY TRACT INFECTION WITHOUT HEMATURIA, SITE UNSPECIFIED: Primary | ICD-10-CM

## 2024-09-03 DIAGNOSIS — I10 HYPERTENSION, UNSPECIFIED TYPE: ICD-10-CM
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
## 2024-09-03 PROBLEM — R26.89 POOR BALANCE: Status: ACTIVE | Noted: 2024-09-03

## 2024-09-03 PROCEDURE — 99349 HOME/RES VST EST MOD MDM 40: CPT | Performed by: NURSE PRACTITIONER

## 2024-09-03 NOTE — PROGRESS NOTES
PROGRESS NOTE    Subjective   Chief complaint: Rizwana Plunkett is a 89 y.o. female who is an assisted living facility patient being seen and evaluated for fall.    HPI:  HPI  Patient present with c/o fall.  Nurse called on 8/28 and reported patient had a fall.  Patient seen and examined at bedside in no apparent distress.  Daughter present and feels patient has been gradually declining.  Patient noted to have poor balance and muscle weakness and well as impaired cognition.  She had labs which were unremarkable.  UC notable for aerococcus sanguinicola.  Patient denies dysuria n/v/f/c.       Objective   Vital signs: 181/81, 97.9, 73, 24, 92%    Physical Exam  Constitutional:       General: She is not in acute distress.  Eyes:      Extraocular Movements: Extraocular movements intact.   Cardiovascular:      Rate and Rhythm: Normal rate and regular rhythm.   Pulmonary:      Effort: Pulmonary effort is normal.      Breath sounds: Normal breath sounds.   Musculoskeletal:      Cervical back: Neck supple.      Comments: Knees buckling anf poor balance noted when standing with walker     Good ROM of extremities   Neurological:      Mental Status: She is alert.   Psychiatric:         Mood and Affect: Mood normal.         Behavior: Behavior is cooperative.         Assessment/Plan   Problem List Items Addressed This Visit       Weakness - Primary     PT OT consult         Dementia (Multi)     Assist with ADLs and care as needed  Speech therapy consult         Poor balance     PT OT consult         Fall     No apparent injury         UTI (urinary tract infection)     Start Macrodantin          Medications, treatments, and labs reviewed  Continue medications and treatments as listed in EMR    Scribe Attestation  I, August Gupta   attest that this documentation has been prepared under the direction and in the presence of NED Starr-CNP    Provider Attestation - Scribe documentation  All medical record entries  made by the Scribe were at my direction and personally dictated by me. I have reviewed the chart and agree that the record accurately reflects my personal performance of the history, physical exam, discussion and plan.   Shama Bang, APRN-CNP

## 2024-09-03 NOTE — LETTER
Patient: Rizwana Plunkett  : 1933    Encounter Date: 2024    PROGRESS NOTE    Subjective  Chief complaint: Rizwana Plunkett is a 89 y.o. female who is an assisted living facility patient being seen and evaluated for respiratory distress.    HPI:  HPIPatient presents with concern for respiratory distress. Nurse called on  and reported patient with sudden onset of respiratory distress with elevated blood pressure of 171/100, pulse ox 79% on room air and low-grade fever. Order was given to send patient to the emergency room for evaluation. Patient returned to assisted living facility with no new orders and improvement in vital signs. At that time. Family elected for hospice referral and order given. In addition it was suggested by nursing facility to move patient's room closer to nurse’s station. Family considering move according to the notes. Patient seen and examined at bedside today in no apparent distress. In addition patient remains on antibiotic for UTI. Denies shortness of breath, nausea vomiting fever chills.      Objective  Vital signs: 181/81-97.9-73-24-92%    Physical Exam  Constitutional:       General: She is not in acute distress.  Eyes:      Extraocular Movements: Extraocular movements intact.   Cardiovascular:      Rate and Rhythm: Normal rate and regular rhythm.   Pulmonary:      Effort: Pulmonary effort is normal.      Breath sounds: Normal breath sounds.   Musculoskeletal:      Cervical back: Neck supple.   Neurological:      Mental Status: She is alert.   Psychiatric:         Mood and Affect: Mood normal.         Behavior: Behavior is cooperative.         Assessment/Plan  Problem List Items Addressed This Visit       Declining functional status     Hospice referral per family request         Dementia (Multi)     Assist with ADLs and care as needed  Speech therapy consult         Hypertension     Monitor blood pressure  Losartan  Amlodipine  Metoprolol         UTI (urinary tract infection) -  Primary     Continue Macrodantin until complete          Medications, treatments, and labs reviewed  Continue medications and treatments as listed in EMR    Scribe Attestation  IRakesh Scribe   attest that this documentation has been prepared under the direction and in the presence of KEV Starr    Provider Attestation - Scribe documentation  All medical record entries made by the Scribe were at my direction and personally dictated by me. I have reviewed the chart and agree that the record accurately reflects my personal performance of the history, physical exam, discussion and plan.   KEV Starr      Electronically Signed By: KEV Starr   9/9/24  8:13 PM

## 2024-09-04 PROBLEM — Z51.5 HOSPICE CARE: Status: ACTIVE | Noted: 2024-09-04

## 2024-09-05 NOTE — PROGRESS NOTES
PROGRESS NOTE    Subjective   Chief complaint: Rizwana Plunkett is a 89 y.o. female who is an assisted living facility patient being seen and evaluated for respiratory distress.    HPI:  HPIPatient presents with concern for respiratory distress. Nurse called on 8/31 and reported patient with sudden onset of respiratory distress with elevated blood pressure of 171/100, pulse ox 79% on room air and low-grade fever. Order was given to send patient to the emergency room for evaluation. Patient returned to assisted living facility with no new orders and improvement in vital signs. At that time. Family elected for hospice referral and order given. In addition it was suggested by nursing facility to move patient's room closer to nurse’s station. Family considering move according to the notes. Patient seen and examined at bedside today in no apparent distress. In addition patient remains on antibiotic for UTI. Denies shortness of breath, nausea vomiting fever chills.      Objective   Vital signs: 181/81-97.9-73-24-92%    Physical Exam  Constitutional:       General: She is not in acute distress.  Eyes:      Extraocular Movements: Extraocular movements intact.   Cardiovascular:      Rate and Rhythm: Normal rate and regular rhythm.   Pulmonary:      Effort: Pulmonary effort is normal.      Breath sounds: Normal breath sounds.   Musculoskeletal:      Cervical back: Neck supple.   Neurological:      Mental Status: She is alert.   Psychiatric:         Mood and Affect: Mood normal.         Behavior: Behavior is cooperative.         Assessment/Plan   Problem List Items Addressed This Visit       Declining functional status     Hospice referral per family request         Dementia (Multi)     Assist with ADLs and care as needed  Speech therapy consult         Hypertension     Monitor blood pressure  Losartan  Amlodipine  Metoprolol         UTI (urinary tract infection) - Primary     Continue Macrodantin until complete           Medications, treatments, and labs reviewed  Continue medications and treatments as listed in EMR    Scribe Attestation  IRakesh Scribe   attest that this documentation has been prepared under the direction and in the presence of KEV Starr    Provider Attestation - Scribe documentation  All medical record entries made by the Scribe were at my direction and personally dictated by me. I have reviewed the chart and agree that the record accurately reflects my personal performance of the history, physical exam, discussion and plan.   KEV Starr

## 2024-09-09 PROBLEM — R53.81 DECLINING FUNCTIONAL STATUS: Status: ACTIVE | Noted: 2024-09-09

## 2024-09-12 ENCOUNTER — NURSING HOME VISIT (OUTPATIENT)
Dept: POST ACUTE CARE | Facility: EXTERNAL LOCATION | Age: 89
End: 2024-09-12
Payer: MEDICARE

## 2024-09-12 DIAGNOSIS — R62.7 FAILURE TO THRIVE IN ADULT: Primary | ICD-10-CM

## 2024-09-12 DIAGNOSIS — I48.91 ATRIAL FIBRILLATION, UNSPECIFIED TYPE (MULTI): ICD-10-CM

## 2024-09-12 DIAGNOSIS — F03.90 DEMENTIA, UNSPECIFIED DEMENTIA SEVERITY, UNSPECIFIED DEMENTIA TYPE, UNSPECIFIED WHETHER BEHAVIORAL, PSYCHOTIC, OR MOOD DISTURBANCE OR ANXIETY (MULTI): ICD-10-CM

## 2024-09-12 DIAGNOSIS — I10 HYPERTENSION, UNSPECIFIED TYPE: ICD-10-CM

## 2024-09-12 PROCEDURE — 99348 HOME/RES VST EST LOW MDM 30: CPT | Performed by: NURSE PRACTITIONER

## 2024-09-12 NOTE — LETTER
Patient: Rizwana Plunkett  : 1933    Encounter Date: 2024    PROGRESS NOTE    Subjective  Chief complaint: Rizwana Plunkett is a 89 y.o. female who is an assisted living facility patient being seen and evaluated for monthly general medical care and follow-up    HPI:  HPI  Patient presents for general medical care and f/u.  Patient seen and examined at bedside.  No issues per nursing.  Patient has no acute complaints.  Patient has recently been admitted to [facility] for diagnosis of heart failure for comfort care measures..  Patient does reports she is feeling pretty good.  Patient has dementia and requires assist with ADLs.  AFIB stable, denies palpitations and chest pain.  HTN, Denies chest pain and headache.    Objective  Vital signs: 127/76, 98.0, 65, 18, 92%    Physical Exam  Constitutional:       General: She is not in acute distress.  Eyes:      Extraocular Movements: Extraocular movements intact.   Cardiovascular:      Rate and Rhythm: Normal rate and regular rhythm.   Pulmonary:      Effort: Pulmonary effort is normal.      Breath sounds: Normal breath sounds.   Musculoskeletal:      Cervical back: Neck supple.      Right lower leg: Edema present.      Left lower leg: Edema present.   Neurological:      Mental Status: She is alert.   Psychiatric:         Mood and Affect: Mood normal.         Behavior: Behavior is cooperative.         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 I have reviewed the chart and agree that the record accurately reflects my personal performance of the history, physical exam, discussion and plan.   KEV Starr      Electronically Signed By: KEV Starr   9/19/24  5:53 PM   Tarsorrhaphy Text: A tarsorrhaphy was performed using Frost sutures.

## 2024-09-16 PROBLEM — R62.7 FAILURE TO THRIVE IN ADULT: Status: ACTIVE | Noted: 2024-09-16

## 2024-09-16 NOTE — PROGRESS NOTES
PROGRESS NOTE    Subjective   Chief complaint: Rizwana Plunkett is a 89 y.o. female who is an assisted living facility patient being seen and evaluated for monthly general medical care and follow-up    HPI:  HPI  Patient presents for general medical care and f/u.  Patient seen and examined at bedside.  No issues per nursing.  Patient has no acute complaints.  Patient has recently been admitted to [facility] for diagnosis of heart failure for comfort care measures..  Patient does reports she is feeling pretty good.  Patient has dementia and requires assist with ADLs.  AFIB stable, denies palpitations and chest pain.  HTN, Denies chest pain and headache.    Objective   Vital signs: 127/76, 98.0, 65, 18, 92%    Physical Exam  Constitutional:       General: She is not in acute distress.  Eyes:      Extraocular Movements: Extraocular movements intact.   Cardiovascular:      Rate and Rhythm: Normal rate and regular rhythm.   Pulmonary:      Effort: Pulmonary effort is normal.      Breath sounds: Normal breath sounds.   Musculoskeletal:      Cervical back: Neck supple.      Right lower leg: Edema present.      Left lower leg: Edema present.   Neurological:      Mental Status: She is alert.   Psychiatric:         Mood and Affect: Mood normal.         Behavior: Behavior is cooperative.         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 I have reviewed the chart and agree that the record accurately reflects my personal performance of the history, physical exam, discussion and plan.   Shama Bang, NED-CNP

## 2024-09-25 ENCOUNTER — APPOINTMENT (OUTPATIENT)
Dept: RADIOLOGY | Facility: HOSPITAL | Age: 89
End: 2024-09-25
Payer: MEDICARE

## 2024-09-25 ENCOUNTER — HOSPITAL ENCOUNTER (EMERGENCY)
Facility: HOSPITAL | Age: 89
Discharge: HOME | End: 2024-09-26
Attending: STUDENT IN AN ORGANIZED HEALTH CARE EDUCATION/TRAINING PROGRAM
Payer: MEDICARE

## 2024-09-25 DIAGNOSIS — S62.101A CLOSED FRACTURE OF RIGHT WRIST, INITIAL ENCOUNTER: ICD-10-CM

## 2024-09-25 DIAGNOSIS — Z51.5 HOSPICE CARE: ICD-10-CM
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
## 2024-09-25 DIAGNOSIS — S72.001A CLOSED FRACTURE OF RIGHT HIP, INITIAL ENCOUNTER: ICD-10-CM

## 2024-09-25 PROCEDURE — 2500000004 HC RX 250 GENERAL PHARMACY W/ HCPCS (ALT 636 FOR OP/ED): Performed by: STUDENT IN AN ORGANIZED HEALTH CARE EDUCATION/TRAINING PROGRAM

## 2024-09-25 PROCEDURE — 73110 X-RAY EXAM OF WRIST: CPT | Mod: RT

## 2024-09-25 PROCEDURE — 2500000004 HC RX 250 GENERAL PHARMACY W/ HCPCS (ALT 636 FOR OP/ED): Performed by: NURSE PRACTITIONER

## 2024-09-25 PROCEDURE — 73502 X-RAY EXAM HIP UNI 2-3 VIEWS: CPT | Mod: RT

## 2024-09-25 PROCEDURE — 12013 RPR F/E/E/N/L/M 2.6-5.0 CM: CPT | Performed by: NURSE PRACTITIONER

## 2024-09-25 PROCEDURE — 99284 EMERGENCY DEPT VISIT MOD MDM: CPT | Mod: 25 | Performed by: NURSE PRACTITIONER

## 2024-09-25 PROCEDURE — 2500000005 HC RX 250 GENERAL PHARMACY W/O HCPCS: Performed by: NURSE PRACTITIONER

## 2024-09-25 PROCEDURE — 96375 TX/PRO/DX INJ NEW DRUG ADDON: CPT | Performed by: STUDENT IN AN ORGANIZED HEALTH CARE EDUCATION/TRAINING PROGRAM

## 2024-09-25 PROCEDURE — 73110 X-RAY EXAM OF WRIST: CPT | Mod: RIGHT SIDE | Performed by: RADIOLOGY

## 2024-09-25 PROCEDURE — 2500000001 HC RX 250 WO HCPCS SELF ADMINISTERED DRUGS (ALT 637 FOR MEDICARE OP): Performed by: NURSE PRACTITIONER

## 2024-09-25 PROCEDURE — 96374 THER/PROPH/DIAG INJ IV PUSH: CPT | Performed by: NURSE PRACTITIONER

## 2024-09-25 PROCEDURE — 29125 APPL SHORT ARM SPLINT STATIC: CPT | Mod: RT | Performed by: NURSE PRACTITIONER

## 2024-09-25 PROCEDURE — 73502 X-RAY EXAM HIP UNI 2-3 VIEWS: CPT | Mod: RIGHT SIDE | Performed by: RADIOLOGY

## 2024-09-25 PROCEDURE — 29075 APPL CST ELBW FNGR SHORT ARM: CPT | Performed by: NURSE PRACTITIONER

## 2024-09-25 PROCEDURE — 29125 APPL SHORT ARM SPLINT STATIC: CPT | Performed by: NURSE PRACTITIONER

## 2024-09-25 RX ORDER — OXYCODONE AND ACETAMINOPHEN 5; 325 MG/1; MG/1
1 TABLET ORAL EVERY 4 HOURS PRN
Status: DISCONTINUED | OUTPATIENT
Start: 2024-09-25 | End: 2024-09-26 | Stop reason: HOSPADM

## 2024-09-25 RX ORDER — LIDOCAINE HYDROCHLORIDE AND EPINEPHRINE 10; 10 MG/ML; UG/ML
5 INJECTION, SOLUTION INFILTRATION; PERINEURAL ONCE
Status: COMPLETED | OUTPATIENT
Start: 2024-09-25 | End: 2024-09-25

## 2024-09-25 RX ORDER — BACITRACIN ZINC 500 UNIT/G
OINTMENT IN PACKET (EA) TOPICAL ONCE
Status: COMPLETED | OUTPATIENT
Start: 2024-09-25 | End: 2024-09-25

## 2024-09-25 RX ORDER — MORPHINE SULFATE 4 MG/ML
4 INJECTION INTRAVENOUS ONCE
Status: COMPLETED | OUTPATIENT
Start: 2024-09-25 | End: 2024-09-25

## 2024-09-25 RX ORDER — MORPHINE SULFATE 4 MG/ML
4 INJECTION INTRAVENOUS
Status: DISCONTINUED | OUTPATIENT
Start: 2024-09-25 | End: 2024-09-26 | Stop reason: HOSPADM

## 2024-09-25 RX ORDER — OXYCODONE HYDROCHLORIDE 5 MG/1
5 TABLET ORAL ONCE
Status: COMPLETED | OUTPATIENT
Start: 2024-09-25 | End: 2024-09-25

## 2024-09-25 RX ORDER — ONDANSETRON HYDROCHLORIDE 2 MG/ML
4 INJECTION, SOLUTION INTRAVENOUS ONCE
Status: COMPLETED | OUTPATIENT
Start: 2024-09-25 | End: 2024-09-25

## 2024-09-25 RX ADMIN — BACITRACIN 1 APPLICATION: 500 OINTMENT TOPICAL at 13:34

## 2024-09-25 RX ADMIN — OXYCODONE HYDROCHLORIDE 5 MG: 5 TABLET ORAL at 13:34

## 2024-09-25 RX ADMIN — MORPHINE SULFATE 4 MG: 4 INJECTION, SOLUTION INTRAMUSCULAR; INTRAVENOUS at 11:39

## 2024-09-25 RX ADMIN — ONDANSETRON 4 MG: 2 INJECTION INTRAMUSCULAR; INTRAVENOUS at 11:39

## 2024-09-25 RX ADMIN — MORPHINE SULFATE 4 MG: 4 INJECTION, SOLUTION INTRAMUSCULAR; INTRAVENOUS at 20:05

## 2024-09-25 RX ADMIN — LIDOCAINE HYDROCHLORIDE,EPINEPHRINE BITARTRATE 5 ML: 10; .01 INJECTION, SOLUTION INFILTRATION; PERINEURAL at 13:50

## 2024-09-25 ASSESSMENT — PAIN DESCRIPTION - PAIN TYPE: TYPE: ACUTE PAIN

## 2024-09-25 ASSESSMENT — LIFESTYLE VARIABLES
TOTAL SCORE: 0
HAVE PEOPLE ANNOYED YOU BY CRITICIZING YOUR DRINKING: NO
HAVE YOU EVER FELT YOU SHOULD CUT DOWN ON YOUR DRINKING: NO
EVER FELT BAD OR GUILTY ABOUT YOUR DRINKING: NO
EVER HAD A DRINK FIRST THING IN THE MORNING TO STEADY YOUR NERVES TO GET RID OF A HANGOVER: NO

## 2024-09-25 ASSESSMENT — PAIN SCALES - GENERAL
PAINLEVEL_OUTOF10: 5 - MODERATE PAIN
PAINLEVEL_OUTOF10: 8
PAINLEVEL_OUTOF10: 0 - NO PAIN
PAINLEVEL_OUTOF10: 3
PAINLEVEL_OUTOF10: 9

## 2024-09-25 ASSESSMENT — PAIN DESCRIPTION - FREQUENCY: FREQUENCY: CONSTANT/CONTINUOUS

## 2024-09-25 ASSESSMENT — PAIN DESCRIPTION - ORIENTATION: ORIENTATION: RIGHT

## 2024-09-25 ASSESSMENT — PAIN DESCRIPTION - PROGRESSION: CLINICAL_PROGRESSION: NOT CHANGED

## 2024-09-25 ASSESSMENT — PAIN - FUNCTIONAL ASSESSMENT
PAIN_FUNCTIONAL_ASSESSMENT: 0-10

## 2024-09-25 ASSESSMENT — PAIN DESCRIPTION - DESCRIPTORS: DESCRIPTORS: ACHING

## 2024-09-25 ASSESSMENT — PAIN DESCRIPTION - LOCATION: LOCATION: OTHER (COMMENT)

## 2024-09-25 NOTE — ED PROVIDER NOTES
HPI   Chief Complaint   Patient presents with    Fall       89-year-old female with a past history of A-fib, depression, hypertension currently on hospice presents the emergency department for evaluation after a fall.  Patient states this morning she tripped and fell onto her right side injuring her right hip and wrist and hitting her head on the ground.  She has been off Eliquis since the beginning of September.  Did not lose consciousness.  Denying any dizziness or vision changes.  She does have a laceration to the right forehead as well as a bony abnormality in the right wrist and significant pain in the right hip.  Did not get anything prior to arrival for her discomfort.  Denies any dizziness, vision changes, neck pain or back pain.  No abdominal pain.  No fever or chills.  Patient is currently on hospice.  Did not get anything prior to arrival for discomfort.                          No data recorded                Patient History   Past Medical History:   Diagnosis Date    Atrial fibrillation (Multi)     Biliary cirrhosis (Multi)     Constipation     Depression     Dorsalgia     Hypertension     Osteoarthritis     Syncope and collapse     Unspecified fracture of sacrum, subsequent encounter for fracture with routine healing     Unspecified fracture of unspecified lumbar vertebra, subsequent encounter for fracture with routine healing      No past surgical history on file.  Family History   Problem Relation Name Age of Onset    No Known Problems Mother      No Known Problems Father       Social History     Tobacco Use    Smoking status: Not on file    Smokeless tobacco: Not on file   Substance Use Topics    Alcohol use: Not on file    Drug use: Not on file       Physical Exam   ED Triage Vitals [09/25/24 1055]   Temperature Heart Rate Respirations BP   36.3 °C (97.3 °F) 65 (!) 22 165/90      Pulse Ox Temp src Heart Rate Source Patient Position   95 % -- Monitor Lying      BP Location FiO2 (%)     Left arm --        Physical Exam  Vitals and nursing note reviewed.   Constitutional:       General: She is not in acute distress.     Appearance: Normal appearance. She is not toxic-appearing.   HENT:      Right Ear: Tympanic membrane normal.      Left Ear: Tympanic membrane normal.      Mouth/Throat:      Mouth: Mucous membranes are moist.      Pharynx: Oropharynx is clear.   Eyes:      Extraocular Movements: Extraocular movements intact.      Pupils: Pupils are equal, round, and reactive to light.   Cardiovascular:      Rate and Rhythm: Normal rate. Rhythm irregular.      Pulses: Normal pulses.      Heart sounds: Normal heart sounds.   Pulmonary:      Effort: Pulmonary effort is normal.      Breath sounds: Normal breath sounds.   Abdominal:      General: Abdomen is flat. Bowel sounds are normal.      Palpations: Abdomen is soft.      Tenderness: There is no abdominal tenderness.   Musculoskeletal:      Right wrist: Swelling, deformity and bony tenderness present. Decreased range of motion. Normal pulse.      Cervical back: Normal range of motion and neck supple.      Right hip: Bony tenderness present. Decreased range of motion.   Skin:     General: Skin is warm and dry.      Capillary Refill: Capillary refill takes less than 2 seconds.   Neurological:      General: No focal deficit present.      Mental Status: She is alert and oriented to person, place, and time.   Psychiatric:         Mood and Affect: Mood normal.         Behavior: Behavior normal.         Judgment: Judgment normal.         Labs Reviewed - No data to display  Pain Management Panel           No data to display              XR hip right with pelvis when performed 2 or 3 views   Final Result   Nondisplaced intertrochanteric fracture right hip..        Signed by: Maggy Honeycutt 9/25/2024 11:53 AM   Dictation workstation:   [id number]      XR wrist right 3+ views   Final Result   1. Comminuted impacted fracture distal right radial diaphysis and   epiphysis.   2.  Nondisplaced ulnar styloid fracture.   3. Remote healed fracture distal diaphysis 5th metacarpal.   4. Osteoarthritis right wrist with DISI abnormality.   5. Erosive osteoarthritis versus gouty arthritis interphalangeal   joint 1st digit and DIP joint 5th digit.                  MACRO:   None        Signed by: Maggy Honeycutt 9/25/2024 11:52 AM   Dictation workstation:   [id number]          ED Course & [facility]   ED Course as of 09/25/24 1447   Wed Sep 25, 2024   1222 Patient does have a fracture of both the wrist and hip.  Family would like me to reach out to patient's hospice nurse Madi. Called her at this time. Waiting for a call back [RB]   1253 Spoke with patient's hospice nurse at this time.  States that she is going to call the daughter and they are going to come up with a plan whether patient needs to go back to her extended care facility or if they can get her back to her home as she believes that is what they want to go with.  Still waiting to hear back to form a plan however daughter adamantly does not want CT imaging of the head. [RB]   1315 Laceration sutured at this time.  Patient is having pain therefore a dose of p.o. oxycodone ordered currently.  Discussed splinting the wrist however daughter does not want a Ortho-Glass splint placed as they are not going to follow-up outpatient she was wanting to get a Velcro splint instead.  Still in discussion with hospice nurse to figure out patient's disposition at this time. [RB]      ED Course User Index  [RB] Gwen Harrington, APRN-CNP       Medical Decision Making  On initial evaluation patient has obvious deformity to the right wrist and right lower extremity is externally rotated and shortened concerning for a hip fracture.  Patient is on hospice and I spoke with her daughter prior to the patient arriving to the emergency department states she does not want CT imaging or a large workup completed.  She is going to remain on hospice regardless and they do  not want any surgical intervention.  She was given morphine with some improvement in the pain.  X-rays were obtained which show a nondisplaced intricate trochanteric fracture of the right hip and a comminuted impacted fracture of the distal right diaphysis and a nondisplaced ulnar styloid fracture.  I discussed this with the patient and daughter.  See my ED course of action.  She was placed in a splint to help with pain control.  Given a dose of oxycodone.  We are currently awaiting to hear back from the hospice nurse decide best route for both the patient and family for pain management.  Will continue to monitor in the ED.        Procedure  Laceration Repair    Performed by: NED King-CNP  Authorized by: Clifton Fonseca MD    Consent:     Consent obtained:  Verbal    Consent given by:  Patient and guardian    Risks, benefits, and alternatives were discussed: yes      Risks discussed:  Infection, pain, poor cosmetic result and poor wound healing    Alternatives discussed:  No treatment  Universal protocol:     Patient identity confirmed:  Verbally with patient and arm band  Anesthesia:     Anesthesia method:  Local infiltration    Local anesthetic:  Lidocaine 1% w/o epi  Laceration details:     Location:  Face    Face location:  R eyebrow    Length (cm):  3.5  Exploration:     Contaminated: no    Treatment:     Area cleansed with:  Rodolfo    Amount of cleaning:  Standard    Irrigation solution:  Sterile saline    Irrigation method:  Syringe    Debridement:  None    Undermining:  None    Scar revision: no    Skin repair:     Repair method:  Sutures    Suture size:  5-0    Suture material:  Prolene    Suture technique:  Simple interrupted    Number of sutures:  4  Approximation:     Approximation:  Close  Repair type:     Repair type:  Simple  Post-procedure details:     Dressing:  Antibiotic ointment    Procedure completion:  Tolerated well, no immediate complications  Comments:      As  patient is currently on hospice and they do not want the sutures removed I did place absorbable 5 oh stitches on the right eyebrow.  Well-approximated.  Splint Application    Performed by: KEV King  Authorized by: Clifton Fonseca MD    Consent:     Consent obtained:  Verbal    Consent given by:  Patient and guardian    Risks, benefits, and alternatives were discussed: yes      Risks discussed:  Discoloration, numbness, pain and swelling    Alternatives discussed:  No treatment  Universal protocol:     Patient identity confirmed:  Verbally with patient and arm band  Pre-procedure details:     Distal neurologic exam:  Normal    Distal perfusion: distal pulses strong and brisk capillary refill    Procedure details:     Location:  Wrist    Wrist location:  R wrist    Cast type:  Short arm    Splint type:  Volar short arm    Supplies:  Elastic bandage, cotton padding and fiberglass    Attestation: Splint applied and adjusted personally by me    Post-procedure details:     Distal neurologic exam:  Normal    Distal perfusion: distal pulses strong, brisk capillary refill and unchanged      Procedure completion:  Tolerated well, no immediate complications  Comments:      Splint was being placed for pain management on hospice and does not want any further intervention.  As patient is the x-ray is impacted.  We did not attempt any improvement of alignment due to this.         KEV King  09/25/24 0575

## 2024-09-26 VITALS
TEMPERATURE: 97.3 F | RESPIRATION RATE: 18 BRPM | DIASTOLIC BLOOD PRESSURE: 88 MMHG | BODY MASS INDEX: 24.55 KG/M2 | OXYGEN SATURATION: 94 % | HEART RATE: 85 BPM | HEIGHT: 61 IN | SYSTOLIC BLOOD PRESSURE: 158 MMHG | WEIGHT: 130 LBS

## 2024-09-26 PROCEDURE — 96376 TX/PRO/DX INJ SAME DRUG ADON: CPT | Performed by: NURSE PRACTITIONER

## 2024-09-26 PROCEDURE — 2500000004 HC RX 250 GENERAL PHARMACY W/ HCPCS (ALT 636 FOR OP/ED): Performed by: STUDENT IN AN ORGANIZED HEALTH CARE EDUCATION/TRAINING PROGRAM

## 2024-09-26 RX ORDER — GLYCOPYRROLATE 0.2 MG/ML
0.2 INJECTION INTRAMUSCULAR; INTRAVENOUS ONCE
Status: DISCONTINUED | OUTPATIENT
Start: 2024-09-26 | End: 2024-09-26 | Stop reason: HOSPADM

## 2024-09-26 RX ADMIN — MORPHINE SULFATE 4 MG: 4 INJECTION, SOLUTION INTRAMUSCULAR; INTRAVENOUS at 13:44

## 2024-09-26 RX ADMIN — MORPHINE SULFATE 4 MG: 4 INJECTION, SOLUTION INTRAMUSCULAR; INTRAVENOUS at 00:18

## 2024-09-26 RX ADMIN — MORPHINE SULFATE 4 MG: 4 INJECTION, SOLUTION INTRAMUSCULAR; INTRAVENOUS at 09:39

## 2024-09-26 ASSESSMENT — PAIN - FUNCTIONAL ASSESSMENT: PAIN_FUNCTIONAL_ASSESSMENT: FLACC (FACE, LEGS, ACTIVITY, CRY, CONSOLABILITY)

## 2024-09-26 ASSESSMENT — PAIN SCALES - GENERAL: PAINLEVEL_OUTOF10: 5 - MODERATE PAIN

## 2024-09-26 NOTE — PROGRESS NOTES
Emergency Medicine Transition of Care Note.    I received Rizwana Plunkett in signout from Dr. Montes.  Please see the previous ED provider note for all HPI, PE and MDM up to the time of signout. This is in addition to the primary record.    In brief Rizwana Plunkett is an 89 y.o. female presenting for   Chief Complaint   Patient presents with    Fall     At the time of signout we were awaiting: discharge in the morning with hospice.     ED Course as of 10/01/24 1211   Wed Sep 25, 2024   1222 Patient does have a fracture of both the wrist and hip.  Family would like me to reach out to patient's hospice nurse Madi. Called her at this time. Waiting for a call back [RB]   1253 Spoke with patient's hospice nurse at this time.  States that she is going to call the daughter and they are going to come up with a plan whether patient needs to go back to her extended care facility or if they can get her back to her home as she believes that is what they want to go with.  Still waiting to hear back to form a plan however daughter adamantly does not want CT imaging of the head. [RB]   1315 Laceration sutured at this time.  Patient is having pain therefore a dose of p.o. oxycodone ordered currently.  Discussed splinting the wrist however daughter does not want a Ortho-Glass splint placed as they are not going to follow-up outpatient she was wanting to get a Velcro splint instead.  Still in discussion with hospice nurse to figure out patient's disposition at this time. [RB]      ED Course User Index  [RB] Gwen Harrington, APRN-CNP         Diagnoses as of 10/01/24 1211   Fall, initial encounter   Closed fracture of right hip, initial encounter (Multi)   Hospice care   Closed fracture of right wrist, initial encounter       Medical Decision Making  The patient was treated for pain over night.  No new concerns were raised.  She was signed out to the morning physician pending discharge with hospice.    Final diagnoses:   [W19.XXXA] Fall,  initial encounter   [S72.001A] Closed fracture of right hip, initial encounter (Multi)   [Z51.5] Hospice care   [S62.101A] Closed fracture of right wrist, initial encounter           Procedure  Procedures    Meagan Bermudez DO

## 2024-09-26 NOTE — PROGRESS NOTES
.  I assumed care of the patient at change of shift.  Patient with right wrist fracture and right hip fracture status post fall declining any definitive intervention is requesting discharge home on hospice was awaiting arrangements for hospice to be set up at home with hospice she had her equipment delivered at 130 today and is able to be discharge from the ED to go home with family under hospice care